# Patient Record
Sex: FEMALE | Race: WHITE | Employment: OTHER
[De-identification: names, ages, dates, MRNs, and addresses within clinical notes are randomized per-mention and may not be internally consistent; named-entity substitution may affect disease eponyms.]

---

## 2017-05-10 ENCOUNTER — OFFICE VISIT (OUTPATIENT)
Dept: DENTISTRY | Facility: CLINIC | Age: 77
End: 2017-05-10

## 2017-05-10 DIAGNOSIS — G47.33 OBSTRUCTIVE SLEEP APNEA: Primary | ICD-10-CM

## 2017-05-10 NOTE — LETTER
5/10/2017       RE: Jonna Bloom  [address]  [facility] [address]     Dear Colleague,    Thank you for referring your patient, Jonna Bloom, to the [facility] at [facility]. Please see a copy of my visit note below.    S:   - No change in general health status  - Appliance still works for pt  - Pt does not report any problems with it  - pt in no acute distress  - no jaw pain, jaw discomfort, or jaw related headaches  - no TMJ pain, no muscle tenderness  - no functional problems, no bite problems  - pt is back for more advancement of the appliance because appliance effect is not optimal  - stress ok  - family history not relevant for ANGELA treatment    O:  - Opening and protrusion not limited  - No M. Mass. + Temp palpation pain , no TMJ palpation pain, no neck palpation pain  - Pt has some occlusal changes on the left side  - Situation seems stable because pt does not hold the thin articulating paper but holds the 0.2 paper  - Appliance has good fit, occlusion OK  - V and VII are grossly intact  - Oral mucosa ok, lips ok, salivary glands ok     A:  - Obstructive sleep apnea    P:  - Discussed bite changes with pt  - Pt has not functional problems but feels sometimes that molars are not touching  - Discussed whether we should shorten the follow-up to 6 months but pt opted for the regular 1-year period  - Pt wondered whether there should a follow-up with sleep physician  I will send a note to nAnie Vilchis to inform her that the pt would appreciate some information what to do next    Again, thank you for allowing me to participate in the care of your patient.      Sincerely,    Malick Alanis DDS

## 2017-05-10 NOTE — PROGRESS NOTES
S:   - No change in general health status  - Appliance still works for pt  - Pt does not report any problems with it  - pt in no acute distress  - no jaw pain, jaw discomfort, or jaw related headaches  - no TMJ pain, no muscle tenderness  - no functional problems, no bite problems  - pt is back for more advancement of the appliance because appliance effect is not optimal  - stress ok  - family history not relevant for ANGELA treatment    O:  - Opening and protrusion not limited  - No M. Mass. + Temp palpation pain , no TMJ palpation pain, no neck palpation pain  - Pt has some occlusal changes on the left side  - Situation seems stable because pt does not hold the thin articulating paper but holds the 0.2 paper  - Appliance has good fit, occlusion OK  - V and VII are grossly intact  - Oral mucosa ok, lips ok, salivary glands ok     A:  - Obstructive sleep apnea    P:  - Discussed bite changes with pt  - Pt has not functional problems but feels sometimes that molars are not touching  - Discussed whether we should shorten the follow-up to 6 months but pt opted for the regular 1-year period  - Pt wondered whether there should a follow-up with sleep physician  - I will send a note to Annie Vilchis to inform her that the pt would appreciate some information what to do next

## 2017-05-15 ENCOUNTER — DOCUMENTATION ONLY (OUTPATIENT)
Dept: SLEEP MEDICINE | Facility: CLINIC | Age: 77
End: 2017-05-15

## 2017-05-15 NOTE — PROGRESS NOTES
Note reviewed from Dr. Alanis.  Pt doing well.      Plan for 2017 was:    Overnight oximetry-does she wish to  device from our office    2. Who does she want at [facility] to review her overnight oximetry    3.  Office visit if needed.

## 2017-07-27 ENCOUNTER — TRANSFERRED RECORDS (OUTPATIENT)
Dept: HEALTH INFORMATION MANAGEMENT | Facility: CLINIC | Age: 77
End: 2017-07-27

## 2017-08-02 ENCOUNTER — OFFICE VISIT (OUTPATIENT)
Dept: SLEEP MEDICINE | Facility: CLINIC | Age: 77
End: 2017-08-02

## 2017-08-02 DIAGNOSIS — G47.33 OSA (OBSTRUCTIVE SLEEP APNEA): Primary | ICD-10-CM

## 2017-08-02 NOTE — PROGRESS NOTES
Patient instructed on JASMIN use. Patient demonstrated and verbalized knowledge of use. Insurance was verified by financial securing. Device programmed. Device will be returned tomorrow before noon.      Kristi LynnGrande  Sleep Clinic - Specialist

## 2017-08-02 NOTE — MR AVS SNAPSHOT
"              After Visit Summary   8/2/2017    Jonna Bloom    MRN: [id number]           Patient Information     Date Of Birth          1940        Visit Information        Provider Department      8/2/2017 1:30 PM BED 7  SLEEP [facility]        Today's Diagnoses     ANGELA (obstructive sleep apnea)    -  1       Follow-ups after your visit        Your next 10 appointments already scheduled     Aug 03, 2017 10:00 AM CDT   Oximetry Drop Off with  SLEEP CENTER DME   [facility])    [address]  [facility] [id number] [phone]            Aug 04, 2017  1:30 PM CDT   Return Sleep Patient with Bennett Ezra Goltz, PA-C   [facility])    [address]  [facility] [id number] [phone]              Who to contact     If you have questions or need follow up information about today's clinic visit or your schedule please contact [facility] directly at [phone].  Normal or non-critical lab and imaging results will be communicated to you by MediaPasshart, letter or phone within 4 business days after the clinic has received the results. If you do not hear from us within 7 days, please contact the clinic through Coolest Coolert or phone. If you have a critical or abnormal lab result, we will notify you by phone as soon as possible.  Submit refill requests through MovieLaLa or call your pharmacy and they will forward the refill request to us. Please allow 3 business days for your refill to be completed.          Additional Information About Your Visit        MediaPasshart Information     MovieLaLa lets you send messages to your doctor, view your test results, renew your prescriptions, schedule appointments and more. To sign up, go to [email or web address] . Click on \"Log in\" on the left side of the screen, which will take you to the Welcome page. Then click on " "\"Sign up Now\" on the right side of the page.     You will be asked to enter the access code listed below, as well as some personal information. Please follow the directions to create your username and password.     Your access code is: [email or web address]  Expires: 10/31/2017  3:15 PM     Your access code will  in 90 days. If you need help or a new code, please call your [facility] clinic or [phone].        Care EveryWhere ID     This is your Care EveryWhere ID. This could be used by other organizations to access your [facility] medical records  [id number]         Blood Pressure from Last 3 Encounters:   16 129/72   07/21/15 140/65   14 132/69    Weight from Last 3 Encounters:   16 60.8 kg (134 lb)   07/21/15 64 kg (141 lb)   14 62.5 kg (137 lb 11.2 oz)              Today, you had the following     No orders found for display       Primary Care Provider Office Phone # Fax #    Isai Gin Castro [phone]       [facility] [address]  [facility] [address]        Equal Access to Services     DRISS ROBLES : Hadcristal olivares Sotee, wafranciscada mg, qaybta kaalmada kiana, amadeo bartlett. So [facility] [phone].    ATENCIÓN: Si habla español, tiene a fuentes disposición servicios gratuitos de asistencia lingüística. Jose Manuelame al [phone].    We comply with applicable federal civil rights laws and [place] laws. We do not discriminate on the basis of race, color, national origin, age, disability sex, sexual orientation or gender identity.            Thank you!     Thank you for choosing [facility]  for your care. Our goal is always to provide you with excellent care. Hearing back from our patients is one way we can continue to improve our services. Please take a few minutes to complete the written survey that you may receive in the mail after your visit with us. Thank you!             Your Updated Medication List - Protect " others around you: Learn how to safely use, store and throw away your medicines at www.disposemymeds.org.          This list is accurate as of: 8/2/17  3:15 PM.  Always use your most recent med list.                   Brand Name Dispense Instructions for use Diagnosis    ATIVAN PO      Take 0.5 mg by mouth as needed        CALCIUM 600 PO      Take 600 mg by mouth daily        calcium polycarbophil 625 MG tablet    FIBERCON     Take 3 tablets by mouth daily        MELATONIN PO      Take by mouth At Bedtime        MIRTAZAPINE PO      Take 7.5 mg by mouth At Bedtime        Multi-vitamin Tabs tablet      Take 1 tablet by mouth daily        NONFORMULARY      Biotin 2500mcg, Acidolphilus daily        OMEGA-3 FISH OIL PO      Take 1 g by mouth        RED YEAST RICE PO      Take by mouth daily        Urea 40 % Crea      Externally apply topically 2 times daily

## 2017-08-04 ENCOUNTER — OFFICE VISIT (OUTPATIENT)
Dept: SLEEP MEDICINE | Facility: CLINIC | Age: 77
End: 2017-08-04
Payer: COMMERCIAL

## 2017-08-04 VITALS
BODY MASS INDEX: 20.25 KG/M2 | HEIGHT: 67 IN | WEIGHT: 129 LBS | OXYGEN SATURATION: 98 % | HEART RATE: 61 BPM | DIASTOLIC BLOOD PRESSURE: 73 MMHG | SYSTOLIC BLOOD PRESSURE: 123 MMHG

## 2017-08-04 DIAGNOSIS — G25.81 RESTLESS LEGS SYNDROME (RLS): ICD-10-CM

## 2017-08-04 DIAGNOSIS — G47.00 INSOMNIA, UNSPECIFIED TYPE: ICD-10-CM

## 2017-08-04 DIAGNOSIS — G47.33 OSA (OBSTRUCTIVE SLEEP APNEA): Primary | ICD-10-CM

## 2017-08-04 PROCEDURE — 99214 OFFICE O/P EST MOD 30 MIN: CPT | Performed by: PHYSICIAN ASSISTANT

## 2017-08-04 NOTE — PROGRESS NOTES
Sleep Study Follow-Up Visit:    Date on this visit: 8/4/2017    Jonna Bloom comes in today for follow-up of her oximetry test. She has concerns of ANGELA, RLS and insomnia.      PSG  ANGELA: 2004 study at [place] with AHI of 17.6/hr. Her AHI was 57.7/hr on her back and 0.6/hr off her back.    She uses a dental appliance made by Malick Alanis at the [facility]. She last saw him a few months ago. Her dental appliance is 2-3 years old. She had another one prior to that. The current one is much more comfortable than the previous one. She notices a little crunching in her right jaw once in a while. She has not had that in a couple of weeks though. She notices it when she first takes it out. She thinks she sleeps mostly on her side. In the last couple of weeks, she has been a little more tired. Otherwise, she does not have significant sleep concerns.     Oximetry results were obtained for the date of 8/2/2017.  The patient was on a treatment of mandibular advancement device.  The study showed a valid recording time of 9:24 with a 4% desaturation index of 8.2/hr.  The mean oxygen saturation was 93% and the lowest SpO2 was 83%.  The patient spent 1.3 minutes below 89% SpO2.    Bedtime is 9-10 PM. She falls asleep in a variable amount of time. Sometimes she falls asleep right awake if she takes 5 mg zolpidem (4-5 times per week). She gets that from [place]. Her primary is Dr. Castro at [facility]. It really seems to help. On other nights, she takes 50 mg trazodone but that does not seem to effect her twitching. She tried mirtazapine a year ago. She used it for a digestive issue, but it increased her appetite. She occasionally takes melatonin. It does not really help. She did not really try levodopa much. That was prescribed over 5 years ago. She has not tried gabapentin. She was told her ferritin was low at [place].     She wakes 6:30-7 AM. She naps for 20 minutes about 1-2 times per week. She may wake 1-2 times per night for the  restroom. She falls right back to sleep. She rarely takes lorazepam, when she flies.    She says her TSH was slightly elevated at 4.5 at [place].    Past medical/surgical history, family history, social history, medications and allergies were reviewed.      Problem List:  Patient Active Problem List    Diagnosis Date Noted     Obstructive sleep apnea 08/04/2015     Priority: Medium     Problem list name updated by automated process. Provider to review       Hamstring muscle strain 04/16/2010     Priority: Medium        Impression/Plan:    (G47.33) ANGELA (obstructive sleep apnea)  (primary encounter diagnosis)  Comment: Her original study showed severe supine ANGELA and none lateral (but no REM on the baseline). She had oximetry with positional restriction in 2013 at [facility] which showed residual events (AHI 4/hr), likely in REM. She was sent to dentistry. The current oximetry study with the dental appliance shows a residual AHI 8/hr (I think she had more frequent minor breathing events that did not reach 4%). She thinks her dental appliance could be advanced, but she has concerns about doing that as she has had some crunching in her TM joint a couple weeks ago.  Plan: I suggested she get a Slumberbump. She can try that with the dental appliance, without the dental appliance, and compare that to how she feels with the dental appliance alone. Give each condition 2 weeks. I do no think she would have much risk to her health from the amount of residual apnea on either treatment, so we will work to find what gives her the best subjective sleep quality.    (G25.81) Restless legs syndrome (RLS)  Comment: She has been using zolpidem 5 mg to help her sleep, it works well. It resolves her twitching. Her ferritin was low according to [facility], but we do not know how low. She will call with that result.   Plan: We talked about how to take iron depending on what her ferritin was. We will recheck the ferritin in 2 months. We will consider  switching to gabapentin if she is still symptomatic once the ferritin is over 50. I do not want to make too many changes at one time. She is doing well on zolpidem.    Addendum 8/9/2017: She presented with lab results for 7/27/17. Her ferritin was 72.    (G47.00) Insomnia, unspecified type  Comment: She has been using 5 mg zolpidem to help her sleep. She also has trazodone but it does not work as well. She spends 8.5-10 hours in bed and has been taking short power naps a couple times per week. She feels she has always needed about 9 hours of sleep.  Plan: She was advised to reduce her time in bed to no more than 8.5 hours. Keep a consistent bedtime and wake time. We will work more on this at future visits.      She will follow up with me in about 2 month(s).     Twenty-five minutes spent with patient, all of which were spent face-to-face counseling, consulting, coordinating plan of care.      Bennett Goltz, PA-C    CC: No ref. provider found

## 2017-08-04 NOTE — PATIENT INSTRUCTIONS
If your ferritin is under 35, take iron twice daily. If between 35 and 50, take it once daily.  Levels under 50 can contribute to restless legs symptoms.  I recommend that you start taking an iron supplement, either ferrous sulfate, ferrous fumarate or ferrous gluconate. The dose should be 325 mg. It will be better absorbed if you take it with 500 mg vitamin C.  Ferrous sulfate is the most common form of iron supplement, but also the most likely to cause an upset stomach. Talk with a pharmacist to discuss options. Vitron C is a brand that has vitamin C included. Side effects of upset stomach, constipation and discoloration of stools can occur.  If these occur, take with food.  Ensure plenty of water and fiber in diet. Consider Colace if constipation is a problem.  Avoid taking it with levothyroxine and antacids.  We will recheck the ferritin level in 2-3 months.  Reduce your time in bed to 8.5 hours. Try to keep a very consistent bedtime and wake time. Do that for 2 weeks. If you continue to have difficulty falling asleep, push your bedtime 15 minutes later for the next week, keep the wake time the same. If you are sleeping through the night reliably, try cutting the zolpidem in half.  Try using a Slumberbump device with and without the dental appliance. Keep track of how you feel. Give each condition at least 2 weeks to have a good sample.  General recommendations for sleep problems (Insomnia)  Allow 2-4 weeks to see results     Establish a regular sleep schedule    Most people only need 7-8 hours of sleep.  Don't be in bed longer than you need     to sleep or you will end up spending more time awake in bed. This trains your    brain to think of the bed as a place to not sleep.  Go to bed at same time each night   Get up at same time each day - Set an alarm everyday (even weekends). This is one of    the most important tips. It prevents you from relying on your insomnia to get you    up on time for your day. That  actually reinforces insomnia. It also will help your    body get into a pattern where you start feeling tired at a consistent time each    night.  The body functions best when you keep a consistent routine.  Avoid sleeping-in and napping. Anytime you sleep during the day, you will be less tired at    night. You may be tired enough to fall asleep, but you will wake more in the    middle of the night because you will have met your sleep need before the night is    done.   Cut down time in bed (if not asleep, get up)- Use your bed only for sleep and sex    Anytime you spend time in bed doing activities other than sleep (reading,    watching TV, working, playing on the computer or phone, or even just laying in    bed trying to sleep), you are training  your brain to think of the bed as a place to    do activities other than sleep. If you are not falling asleep within 20-30 minutes,    get out of bed. While out of bed, avoid bright lights. Avoid work or chores. Being    productive in the middle of the night reinforces waking up at night. Find relaxing,    not particularly entertaining activities like reading, listening to music, or relaxation    exercises. Go back to bed if you start feeling groggy, or after about 30 minutes,    even if not feeling very tired. Sometimes, just getting out of bed stops the pattern    of getting frustrated about laying in bed not sleeping, and that can help you fall    asleep.   Avoid trying to force yourself to sleep- sleep is not like everything else. The harder you    work at most things, the more you can accomplish. The harder you work at    sleep, the less you will sleep.     Make the bedroom comfortable - quiet, dark and cool are better. Consider ear plugs    (silicon). Use dark blinds or wear an eye mask if needed     Make a relaxing routine prior to bedtime  Relaxation exercises:   Progressive muscle relaxation: Relax each muscle group individually    Begin with your feet, flex,  then relax. Try to imagine your feet feeling heavy and sinking into the bed. Move to your calves, do the same thing. Work through each muscle group toward your head.    Relaxing Mental Imagery: Try to imagine a trip that you took and found relaxing, or imagine a day at the beach. Try to walk yourself through the day in your mind as if you were dreaming it. Try to imagine sensing the different experiences, such as feeling sand between your toes, the heat of the sun on your skin, seeing the waves crashing the shore, the smell of the salt water, etc.     Deal with your worries before bedtime    Set aside a worry time around dinner time for 10-15 minutes. Write down the    things that are on your mind. Plan time in the coming days to address those    issues. Brainstorm ideas on how you will deal with them. Try to identify issues    that are out of your control, and try to let those issues go.  Listen to relaxation tapes   Classical Music or Nature sounds   Back Massage   Get regular exercise each day (at least 1-2 hours before bedtime)   Take medications only as directed   Eat a light bedtime snack or warm drink   Warm milk   Warm herbal tea (non-caffeinated)       Things to avoid   No overstimulating activities just before bed   No competitive games before bedtime   No exciting television programs before bedtime   Avoid caffeine after lunchtime   Avoid chocolate   Do not use alcohol to induce sleep (worsens Insomnia)   Do not take someone else's sleeping pills   Do not look at the clock when awakening   Do not turn on light when getting up to use bathroom, use a nightlight     Online Programs     www.SHUTi.me (pronounced shut eye). There is a fee for this program. Enter the code  [place]  if you decide to enroll in this program.      www.sleepIO.com (pronounced sleep ee oh). There is a fee for this program. Enter the code  [place]  if you decide to enroll in this program.     Suggested Resources  Insomnia Treatment  Books     Overcoming Insomnia by Srinath Valerio and Rocio Hubbard (2008)    No More Sleepless Nights by Johan aGy and Ro Pierce (1996)    Say Marylin to Insomnia by Ryan Nuñez (2009)    The Insomnia Workbook by Ann Briones and Elver Vance (2009)    The Insomnia Answer by Ward Carlos and Julio Munguia (2006)      Stress Management and Relaxation Books    The Relaxation and Stress Reduction Workbook by Laila Gerardo, Fannie Mello and David Krishnan (2008)    Stress Management Workbook: Techniques and Self-Assessment Procedures by Natalie Amaya and Byron Joyce (1997)    A Mindfulness-Based Stress Reduction Workbook by Kavon Whyte and Lory Ibarra (2010)    The Complete Stress Management Workbook by Brendan Pham, Fahad Manuel and Bill Beavers (1996)    Assert Yourself by Cary Adams and Zeke Adams (1977)    Relaxation Resources for Computer Download   These websites offer resources to help you relax. This list is for information only. [facility] is not responsible for the quality of services or the actions of any person or organization.  Progressive Muscle Relaxation (PMR):     http://www.Sellvana/progressive-muscle-relaxation-exercise.html     http://studentsupport.[facility]/counseling/resources/self-help/relaxation-and-stress-management/   Deep Breathing Exercises:    http://www.Sellvana/breathing-awareness.html     Meditation:     www.Conduit    www.theddmap.comguidedddmap.commeditation-site.com You may have to pay for some of these resources.    Guided Imagery:    http://www.Sellvana/guided-imagery-scripts.html     http://"[employer]"/library/deokrhfvcb-sbqfny-uvphovq/     Counseling / Behavioral Health  [facility]  Visit [email or web address] or call [phone] to find a clinic close to you.  Or call [phone] for [facility].       Your BMI is Body  mass index is 20.06 kg/(m^2).  Weight management is a personal decision.  If you are interested in exploring weight loss strategies, the following discussion covers the approaches that may be successful. Body mass index (BMI) is one way to tell whether you are at a healthy weight, overweight, or obese. It measures your weight in relation to your height.  A BMI of 18.5 to 24.9 is in the healthy range. A person with a BMI of 25 to 29.9 is considered overweight, and someone with a BMI of 30 or greater is considered obese. More than two-thirds of American adults are considered overweight or obese.  Being overweight or obese increases the risk for further weight gain. Excess weight may lead to heart disease and diabetes.  Creating and following plans for healthy eating and physical activity may help you improve your health.  Weight control is part of healthy lifestyle and includes exercise, emotional health, and healthy eating habits. Careful eating habits lifelong are the mainstay of weight control. Though there are significant health benefits from weight loss, long-term weight loss with diet alone may be very difficult to achieve- studies show long-term success with dietary management in less than 10% of people. Attaining a healthy weight may be especially difficult to achieve in those with severe obesity. In some cases, medications, devices and surgical management might be considered.  What can you do?  If you are overweight or obese and are interested in methods for weight loss, you should discuss this with your provider.     Consider reducing daily calorie intake by 500 calories.     Keep a food journal.     Avoiding skipping meals, consider cutting portions instead.    Diet combined with exercise helps maintain muscle while optimizing fat loss. Strength training is particularly important for building and maintaining muscle mass. Exercise helps reduce stress, increase energy, and improves fitness. Increasing  exercise without diet control, however, may not burn enough calories to loose weight.       Start walking three days a week 10-20 minutes at a time    Work towards walking thirty minutes five days a week     Eventually, increase the speed of your walking for 1-2 minutes at time    In addition, we recommend that you review healthy lifestyles and methods for weight loss available through the National Institutes of Health patient information sites:  http://win.niddk.nih.gov/publications/index.htm    And look into health and wellness programs that may be available through your health insurance provider, employer, local community center, or glendy club.      ]

## 2017-08-04 NOTE — NURSING NOTE
"Chief Complaint   Patient presents with     Results     JASMIN results       Initial /73  Pulse 61  Ht 1.708 m (5' 7.24\")  Wt 58.5 kg (129 lb)  SpO2 98%  BMI 20.06 kg/m2 Estimated body mass index is 20.06 kg/(m^2) as calculated from the following:    Height as of this encounter: 1.708 m (5' 7.24\").    Weight as of this encounter: 58.5 kg (129 lb).  Medication Reconciliation: complete   Sara Ambrocio MA      "

## 2017-08-04 NOTE — MR AVS SNAPSHOT
After Visit Summary   8/4/2017    Jonna Bloom    MRN: [id number]           Patient Information     Date Of Birth          1940        Visit Information        Provider Department      8/4/2017 1:30 PM Goltz, Bennett Ezra, PA-C [facility]        Today's Diagnoses     ANGELA (obstructive sleep apnea)    -  1    Restless legs syndrome (RLS)        Insomnia, unspecified type          Care Instructions    If your ferritin is under 35, take iron twice daily. If between 35 and 50, take it once daily.  Levels under 50 can contribute to restless legs symptoms.  I recommend that you start taking an iron supplement, either ferrous sulfate, ferrous fumarate or ferrous gluconate. The dose should be 325 mg. It will be better absorbed if you take it with 500 mg vitamin C.  Ferrous sulfate is the most common form of iron supplement, but also the most likely to cause an upset stomach. Talk with a pharmacist to discuss options. Vitron C is a brand that has vitamin C included. Side effects of upset stomach, constipation and discoloration of stools can occur.  If these occur, take with food.  Ensure plenty of water and fiber in diet. Consider Colace if constipation is a problem.  Avoid taking it with levothyroxine and antacids.  We will recheck the ferritin level in 2-3 months.  Reduce your time in bed to 8.5 hours. Try to keep a very consistent bedtime and wake time. Do that for 2 weeks. If you continue to have difficulty falling asleep, push your bedtime 15 minutes later for the next week, keep the wake time the same. If you are sleeping through the night reliably, try cutting the zolpidem in half.  Try using a Slumberbump device with and without the dental appliance. Keep track of how you feel. Give each condition at least 2 weeks to have a good sample.  General recommendations for sleep problems (Insomnia)  Allow 2-4 weeks to see results     Establish a regular sleep schedule    Most people  only need 7-8 hours of sleep.  Don't be in bed longer than you need     to sleep or you will end up spending more time awake in bed. This trains your    brain to think of the bed as a place to not sleep.  Go to bed at same time each night   Get up at same time each day - Set an alarm everyday (even weekends). This is one of    the most important tips. It prevents you from relying on your insomnia to get you    up on time for your day. That actually reinforces insomnia. It also will help your    body get into a pattern where you start feeling tired at a consistent time each    night.  The body functions best when you keep a consistent routine.  Avoid sleeping-in and napping. Anytime you sleep during the day, you will be less tired at    night. You may be tired enough to fall asleep, but you will wake more in the    middle of the night because you will have met your sleep need before the night is    done.   Cut down time in bed (if not asleep, get up)- Use your bed only for sleep and sex    Anytime you spend time in bed doing activities other than sleep (reading,    watching TV, working, playing on the computer or phone, or even just laying in    bed trying to sleep), you are training  your brain to think of the bed as a place to    do activities other than sleep. If you are not falling asleep within 20-30 minutes,    get out of bed. While out of bed, avoid bright lights. Avoid work or chores. Being    productive in the middle of the night reinforces waking up at night. Find relaxing,    not particularly entertaining activities like reading, listening to music, or relaxation    exercises. Go back to bed if you start feeling groggy, or after about 30 minutes,    even if not feeling very tired. Sometimes, just getting out of bed stops the pattern    of getting frustrated about laying in bed not sleeping, and that can help you fall    asleep.   Avoid trying to force yourself to sleep- sleep is not like everything else.  The harder you    work at most things, the more you can accomplish. The harder you work at    sleep, the less you will sleep.     Make the bedroom comfortable - quiet, dark and cool are better. Consider ear plugs    (silicon). Use dark blinds or wear an eye mask if needed     Make a relaxing routine prior to bedtime  Relaxation exercises:   Progressive muscle relaxation: Relax each muscle group individually    Begin with your feet, flex, then relax. Try to imagine your feet feeling heavy and sinking into the bed. Move to your calves, do the same thing. Work through each muscle group toward your head.    Relaxing Mental Imagery: Try to imagine a trip that you took and found relaxing, or imagine a day at the beach. Try to walk yourself through the day in your mind as if you were dreaming it. Try to imagine sensing the different experiences, such as feeling sand between your toes, the heat of the sun on your skin, seeing the waves crashing the shore, the smell of the salt water, etc.     Deal with your worries before bedtime    Set aside a worry time around dinner time for 10-15 minutes. Write down the    things that are on your mind. Plan time in the coming days to address those    issues. Brainstorm ideas on how you will deal with them. Try to identify issues    that are out of your control, and try to let those issues go.  Listen to relaxation tapes   Classical Music or Nature sounds   Back Massage   Get regular exercise each day (at least 1-2 hours before bedtime)   Take medications only as directed   Eat a light bedtime snack or warm drink   Warm milk   Warm herbal tea (non-caffeinated)       Things to avoid   No overstimulating activities just before bed   No competitive games before bedtime   No exciting television programs before bedtime   Avoid caffeine after lunchtime   Avoid chocolate   Do not use alcohol to induce sleep (worsens Insomnia)   Do not take someone else's sleeping pills   Do not look at the  clock when awakening   Do not turn on light when getting up to use bathroom, use a nightlight     Online Programs     www.SHUTi.me (pronounced shut eye). There is a fee for this program. Enter the code  [place]  if you decide to enroll in this program.      www.sleepIO.com (pronounced sleep ee oh). There is a fee for this program. Enter the code  [place]  if you decide to enroll in this program.     Suggested Resources  Insomnia Treatment Books     Overcoming Insomnia by Srinath Valerio and Rocio Hubbard (2008)    No More Sleepless Nights by Johan Gay and Ro Pierce (1996)    Say Marylin to Insomnia by Ryan Nuñez (2009)    The Insomnia Workbook by Ann Briones and Elver Vance (2009)    The Insomnia Answer by Ward Carlos and Julio Munguia (2006)      Stress Management and Relaxation Books    The Relaxation and Stress Reduction Workbook by Laila Gerardo, Fannie Mello and David Krishnan (2008)    Stress Management Workbook: Techniques and Self-Assessment Procedures by Natalie Amaya and Byron Joyce (1997)    A Mindfulness-Based Stress Reduction Workbook by Kavon Whyte and Lory Ibarra (2010)    The Complete Stress Management Workbook by Brendan Pham, Fahad Manuel and Bill Beavers (1996)    Assert Yourself by Cary Adams and Zeke Adams (1977)    Relaxation Resources for Computer Download   These websites offer resources to help you relax. This list is for information only. [place] is not responsible for the quality of services or the actions of any person or organization.  Progressive Muscle Relaxation (PMR):     http://www.Corceuticals.Banki.ru/progressive-muscle-relaxation-exercise.html     http://studentsupport.[facility]/counseling/resources/self-help/relaxation-and-stress-management/   Deep Breathing Exercises:    http://www.Corceuticals.Banki.ru/breathing-awareness.html      Meditation:     www.Varthana    www.thePlastioguided-meditation-site.com You may have to pay for some of these resources.    Guided Imagery:    http://www.[employer]/guided-imagery-scripts.html     http://Tus reQRdos/library/gmdppzhcct-yevszx-oihdqig/     Counseling / Behavioral Health  [facility]  Visit [email or web address] or call [phone] to find a clinic close to you.  Or call [phone] for [facility].       Your BMI is Body mass index is 20.06 kg/(m^2).  Weight management is a personal decision.  If you are interested in exploring weight loss strategies, the following discussion covers the approaches that may be successful. Body mass index (BMI) is one way to tell whether you are at a healthy weight, overweight, or obese. It measures your weight in relation to your height.  A BMI of 18.5 to 24.9 is in the healthy range. A person with a BMI of 25 to 29.9 is considered overweight, and someone with a BMI of 30 or greater is considered obese. More than two-thirds of American adults are considered overweight or obese.  Being overweight or obese increases the risk for further weight gain. Excess weight may lead to heart disease and diabetes.  Creating and following plans for healthy eating and physical activity may help you improve your health.  Weight control is part of healthy lifestyle and includes exercise, emotional health, and healthy eating habits. Careful eating habits lifelong are the mainstay of weight control. Though there are significant health benefits from weight loss, long-term weight loss with diet alone may be very difficult to achieve- studies show long-term success with dietary management in less than 10% of people. Attaining a healthy weight may be especially difficult to achieve in those with severe obesity. In some cases, medications, devices and surgical management might be considered.  What can you do?  If you are  overweight or obese and are interested in methods for weight loss, you should discuss this with your provider.     Consider reducing daily calorie intake by 500 calories.     Keep a food journal.     Avoiding skipping meals, consider cutting portions instead.    Diet combined with exercise helps maintain muscle while optimizing fat loss. Strength training is particularly important for building and maintaining muscle mass. Exercise helps reduce stress, increase energy, and improves fitness. Increasing exercise without diet control, however, may not burn enough calories to loose weight.       Start walking three days a week 10-20 minutes at a time    Work towards walking thirty minutes five days a week     Eventually, increase the speed of your walking for 1-2 minutes at time    In addition, we recommend that you review healthy lifestyles and methods for weight loss available through the National Institutes of Health patient information sites:  http://win.niddk.nih.gov/publications/index.htm    And look into health and wellness programs that may be available through your health insurance provider, employer, local community center, or [employer].      ]          Follow-ups after your visit        Who to contact     If you have questions or need follow up information about today's clinic visit or your schedule please contact [facility] directly at [phone].  Normal or non-critical lab and imaging results will be communicated to you by MyChart, letter or phone within 4 business days after the clinic has received the results. If you do not hear from us within 7 days, please contact the clinic through MyChart or phone. If you have a critical or abnormal lab result, we will notify you by phone as soon as possible.  Submit refill requests through [employer] or call your pharmacy and they will forward the refill request to us. Please allow 3 business days for your refill to be completed.          Additional  "Information About Your Visit        MyChart Information     V[employer]olio lets you send messages to your doctor, view your test results, renew your prescriptions, schedule appointments and more. To sign up, go to [email or web address] . Click on \"Log in\" on the left side of the screen, which will take you to the Welcome page. Then click on \"Sign up Now\" on the right side of the page.     You will be asked to enter the access code listed below, as well as some personal information. Please follow the directions to create your username and password.     Your access code is: [email or web address]  Expires: 10/31/2017  3:15 PM     Your access code will  in 90 days. If you need help or a new code, please call your [place] clinic or [phone].        Care EveryWhere ID     This is your Care EveryWhere ID. This could be used by other organizations to access your [place] medical records  [id number]        Your Vitals Were     Pulse Height Pulse Oximetry BMI (Body Mass Index)          61 1.708 m (5' 7.24\") 98% 20.06 kg/m2         Blood Pressure from Last 3 Encounters:   17 123/73   16 129/72   07/21/15 140/65    Weight from Last 3 Encounters:   17 58.5 kg (129 lb)   16 60.8 kg (134 lb)   07/21/15 64 kg (141 lb)              Today, you had the following     No orders found for display       Primary Care Provider Office Phone # Fax #    Isai Gin Castro [phone]       [facility] [address]  [facility] [address]        Equal Access to Services     [facility]: Hadii mera Henry, reed holliday, amadeo marshall. So [facility] [phone].    ATENCIÓN: Si habla español, tiene a fuentes disposición servicios gratuitos de asistencia lingüística. Llame al [phone].    We comply with applicable federal civil rights laws and [place] laws. We do not discriminate on the basis of race, color, national origin, age, disability " sex, sexual orientation or gender identity.            Thank you!     Thank you for choosing [facility]  for your care. Our goal is always to provide you with excellent care. Hearing back from our patients is one way we can continue to improve our services. Please take a few minutes to complete the written survey that you may receive in the mail after your visit with us. Thank you!             Your Updated Medication List - Protect others around you: Learn how to safely use, store and throw away your medicines at www.disposemymeds.org.          This list is accurate as of: 8/4/17  2:42 PM.  Always use your most recent med list.                   Brand Name Dispense Instructions for use Diagnosis    ATIVAN PO      Take 0.5 mg by mouth as needed        CALCIUM 600 PO      Take 600 mg by mouth daily        calcium polycarbophil 625 MG tablet    FIBERCON     Take 3 tablets by mouth daily        MELATONIN PO      Take by mouth At Bedtime        Multi-vitamin Tabs tablet      Take 1 tablet by mouth daily        NONFORMULARY      Biotin 2500mcg, Acidolphilus daily        OMEGA-3 FISH OIL PO      Take 1 g by mouth        RED YEAST RICE PO      Take by mouth daily        Urea 40 % Crea      Externally apply topically 2 times daily

## 2017-08-20 NOTE — PROCEDURES
PHYSICIAN INTERPRETATION   HOME OXIMETRY   Patient: Jonna Bloom  MRN: [id number]  YOB: 1940  Study Date: 8/2/17   Referring Physician: Isai Castro  Ordering Physician: Annie Vilchis MD     Conditions:  MAD,Room air  Quality: artifact noted 0.2%     Measure [threshold]                 Time less than or equal to SpO2 88% [5 min]:  1.3 min  Duration monitoring [> 2 hours artifact free]:  9:24 hours                    4% O2 desat index [ > 15/ hour]:    8.2 / hour                    Pattern:       normal                                  Assessment:   Normal study    Recommendations:  The following changes in MAD settings were ordered:  None, pt will follow with Bennett Goltz PA    Diagnosis Code(s):    JOVITA Luke CNP, August 20, 2017

## 2017-08-23 ENCOUNTER — OFFICE VISIT (OUTPATIENT)
Dept: DENTISTRY | Facility: CLINIC | Age: 77
End: 2017-08-23

## 2017-08-23 DIAGNOSIS — G47.33 OBSTRUCTIVE SLEEP APNEA: Primary | ICD-10-CM

## 2017-08-23 NOTE — LETTER
8/23/2017       RE: Jonna Bloom  [address]  [place] [place] [address]     Dear Colleague,    Thank you for referring your patient, Jonna Bloom, to the [facility] at [facility]. Please see a copy of my visit note below.    S:   - No change in general health status  - pt in no acute distress  - Pt reports problems with appliance  - Pt report joint noises (right more than left side) and is worried about the noises  - Pt also had once problems chewing, pt reported that appliance aggravates the joint problem  - Pt pointed also to masseter area where problems occurred  - Pain intensity was not very strong, pt limited chewing   - Pt has a history of other joint problem, hip replacement  - family history not relevant for ANGELA treatment    O:  - Opening and protrusion not limited  - No M. Mass. + Temp palpation pain , no TMJ palpation pain, no neck palpation pain  - Crepitus on both sides, right more than left  - Pt has some occlusal changes on the left side but they are stable  - Appliance has good fit, occlusion OK  - V and VII are grossly intact  - Oral mucosa ok, lips ok, salivary glands ok   - Backwards adjustment of appliance -0.5mm    A:  - Obstructive sleep apnea  - Osteoarthrosis of the TMJ  - TMJ arthralgia  - Myofascial pain    P:  - Discussed the structural changes in the TMJ  - Explained to pt that jaw can function adequately even if substantial degenerative TMJ changes are present  - Pt should decrease TMJ load (avoid wide mouth opening, cut apples to avoid mouth opening, put hand under chin to limit wide mouth opening)  - When TMJ problems get more severe, pt should use appliance less  - Physical therapy can be used in the future to help with TMJ pain  - Pt is scheduled for October for follow-up with sleep physician  - I will write a note to sleep physician to inform him about TMJ situation  Total time: 50 min, spent 40 counseling and coordinating  care    Again, thank you for allowing me to participate in the care of your patient.      Sincerely,    Malick Alanis DDS

## 2017-08-25 NOTE — PROGRESS NOTES
S:   - No change in general health status  - pt in no acute distress  - Pt reports problems with appliance  - Pt report joint noises (right more than left side) and is worried about the noises  - Pt also had once problems chewing, pt reported that appliance aggravates the joint problem  - Pt pointed also to masseter area where problems occurred  - Pain intensity was not very strong, pt limited chewing   - Pt has a history of other joint problem, hip replacement  - family history not relevant for ANGELA treatment    O:  - Opening and protrusion not limited  - No M. Mass. + Temp palpation pain , no TMJ palpation pain, no neck palpation pain  - Crepitus on both sides, right more than left  - Pt has some occlusal changes on the left side but they are stable  - Appliance has good fit, occlusion OK  - V and VII are grossly intact  - Oral mucosa ok, lips ok, salivary glands ok   - Backwards adjustment of appliance -0.5mm    A:  - Obstructive sleep apnea  - Osteoarthrosis of the TMJ  - TMJ arthralgia  - Myofascial pain    P:  - Discussed the structural changes in the TMJ  - Explained to pt that jaw can function adequately even if substantial degenerative TMJ changes are present  - Pt should decrease TMJ load (avoid wide mouth opening, cut apples to avoid mouth opening, put hand under chin to limit wide mouth opening)  - When TMJ problems get more severe, pt should use appliance less  - Physical therapy can be used in the future to help with TMJ pain  - Pt is scheduled for October for follow-up with sleep physician  - I will write a note to sleep physician to inform him about TMJ situation  - Total time: 50 min, spent 40 counseling and coordinating care

## 2017-09-29 DIAGNOSIS — G25.81 RESTLESS LEGS SYNDROME (RLS): ICD-10-CM

## 2017-09-29 PROCEDURE — 82728 ASSAY OF FERRITIN: CPT | Performed by: PHYSICIAN ASSISTANT

## 2017-09-29 PROCEDURE — 36415 COLL VENOUS BLD VENIPUNCTURE: CPT | Performed by: PHYSICIAN ASSISTANT

## 2017-09-30 LAB — FERRITIN SERPL-MCNC: 101 NG/ML (ref 8–252)

## 2017-10-04 ENCOUNTER — OFFICE VISIT (OUTPATIENT)
Dept: SLEEP MEDICINE | Facility: CLINIC | Age: 77
End: 2017-10-04
Payer: COMMERCIAL

## 2017-10-04 VITALS
WEIGHT: 127 LBS | HEIGHT: 67 IN | HEART RATE: 58 BPM | BODY MASS INDEX: 19.93 KG/M2 | SYSTOLIC BLOOD PRESSURE: 129 MMHG | DIASTOLIC BLOOD PRESSURE: 74 MMHG | RESPIRATION RATE: 14 BRPM | OXYGEN SATURATION: 98 %

## 2017-10-04 DIAGNOSIS — G47.33 OBSTRUCTIVE SLEEP APNEA: Primary | ICD-10-CM

## 2017-10-04 DIAGNOSIS — F51.04 CHRONIC INSOMNIA: ICD-10-CM

## 2017-10-04 DIAGNOSIS — G25.81 RESTLESS LEGS SYNDROME (RLS): ICD-10-CM

## 2017-10-04 PROCEDURE — 99214 OFFICE O/P EST MOD 30 MIN: CPT | Performed by: PHYSICIAN ASSISTANT

## 2017-10-04 RX ORDER — GABAPENTIN 100 MG/1
CAPSULE ORAL
Qty: 90 CAPSULE | Refills: 1 | Status: SHIPPED | OUTPATIENT
Start: 2017-10-04 | End: 2019-01-24

## 2017-10-04 NOTE — NURSING NOTE
"Chief Complaint   Patient presents with     Sleep Problem     Follow up lab work       Initial /74  Pulse 58  Resp 14  Ht 1.702 m (5' 7\")  Wt 57.6 kg (127 lb)  SpO2 98%  BMI 19.89 kg/m2 Estimated body mass index is 19.89 kg/(m^2) as calculated from the following:    Height as of this encounter: 1.702 m (5' 7\").    Weight as of this encounter: 57.6 kg (127 lb).  Medication Reconciliation: complete   ESS 2  Sara Ambrocio MA      "

## 2017-10-04 NOTE — PATIENT INSTRUCTIONS
Switch from zolpidem to gabapentin. Take it about 15-30 minutes prior to typical onset of restlessness. Start with 100 mg (1 capsule). You may increase every 5 days by 1 capsule as needed. If you get to 300 mg, you can call me and I will prescribe a 300 mg capsule and you can go up from there ([phone]). The max is 1200 mg.

## 2017-10-04 NOTE — PROGRESS NOTES
Sleep Study Follow-Up Visit:    Date on this visit: 10/4/2017    Jonna Bloom comes in today for follow-up of her treatment for ANGELA, RLS and insomnia.     PSG  ANGELA: 2004 study at [facility] with AHI of 17.6/hr. Her AHI was 57.7/hr on her back and 0.6/hr off her back.  At her last visit, we discussed experimenting with positional restriction and the dental appliance in conjunction and in isolation. She was to identify which gave her the best subjective sleep.  Oximetry in August with the dental appliance showed an ERIN of 8/hr.     She uses 5 mg zolpidem to treat RLS and insomnia. Her ferritin was 72 at [facility] in July. It was 101 on 9/29. She is on 1 tab of iron. She does not notice a difference in her restlessness.    She feels things are going alright. She did not have a good experience with Slumberbump. The customer service was poor and the sizes were not right for her. She uses a backpack with tennis balls in it. She has a hard time evaluating how well she is sleeping.   Bedtime has been 9 PM. She falls asleep quickly with zolpidem 5 mg. She has struggled to stay up later. She is up at 6:30 AM. She is sometimes awake before then. She wakes at 12 and 4 AM for the restroom. She denies much trouble getting back to sleep. She feels she would twitch if she did not take the zolpidem. She is sometimes tired in the afternoon if she has been really active in the morning. She denies dozing if she sits to rest.  She recalls being given a medication (at [facility]) for Parkinsons for her RLS, but being told not to take it too much or she would have to switch medication. She felt zolpidem worked much better.    Past medical/surgical history, family history, social history, medications and allergies were reviewed.      Problem List:  Patient Active Problem List    Diagnosis Date Noted     Obstructive sleep apnea 08/04/2015     Priority: Medium     Problem list name updated by automated process. Provider to review       Hamstring muscle  strain 04/16/2010     Priority: Medium        Impression/Plan:    (G47.33) Obstructive sleep apnea  (primary encounter diagnosis)  Comment: She has been treating with dental appliance and positional restriction. She may have very mild residual ANGELA at times, but not a concerning health risk. She does not really appreciate much difference with either device.   Plan: Continue to evaluate sleep quality with different treatments (dental appliance, positional restriction, or both). Use whatever seems to be most effective or most convenient. Either are reasonably effective.    (G25.81) Restless legs syndrome (RLS)  Comment: She has been using 5 mg zolpidem. She says she gets twitchy without it. Her ferritin is 101.   Plan: gabapentin (NEURONTIN) 100 MG capsule        We discussed that zolpidem is not advised in people over 65. We will switch from zolpidem to gabapentin 100 mg capsules. May increase gabapentin by 100 mg every 5 days as needed. She was told to call if she gets to 300 mg and I can prescribe a larger capsule. Max 1200 mg. She was told she can discontinue iron for now.    (F51.04) Chronic insomnia  Comment: She takes 5 mg zolpidem and sleep reasonably well. She sleeps from 9 PM to 6:30 AM. She does not know how she would sleep without zolpidem. She wakes twice a night for the restroom.  Plan: If she has more trouble sleeping off of the zolpidem, she was encouraged to either go to bed 30 minutes later or get up 30 minutes earlier. We will discuss further recommendations at the follow up as needed      She will follow up with me in about 2 month(s).     Twenty-five minutes spent with patient, all of which were spent face-to-face counseling, consulting, coordinating plan of care.      Bennett Goltz, PA-C    CC: No ref. provider found

## 2018-10-24 ENCOUNTER — OFFICE VISIT (OUTPATIENT)
Dept: SLEEP MEDICINE | Facility: CLINIC | Age: 78
End: 2018-10-24
Payer: COMMERCIAL

## 2018-10-24 VITALS
SYSTOLIC BLOOD PRESSURE: 133 MMHG | BODY MASS INDEX: 20.88 KG/M2 | HEIGHT: 67 IN | WEIGHT: 133 LBS | HEART RATE: 56 BPM | OXYGEN SATURATION: 98 % | RESPIRATION RATE: 14 BRPM | TEMPERATURE: 97.8 F | DIASTOLIC BLOOD PRESSURE: 80 MMHG

## 2018-10-24 DIAGNOSIS — G47.33 OBSTRUCTIVE SLEEP APNEA: Primary | ICD-10-CM

## 2018-10-24 DIAGNOSIS — F51.04 CHRONIC INSOMNIA: ICD-10-CM

## 2018-10-24 DIAGNOSIS — G25.81 RESTLESS LEGS SYNDROME (RLS): ICD-10-CM

## 2018-10-24 PROCEDURE — 99214 OFFICE O/P EST MOD 30 MIN: CPT | Performed by: PHYSICIAN ASSISTANT

## 2018-10-24 NOTE — MR AVS SNAPSHOT
After Visit Summary   10/24/2018    Jonna Bloom    MRN: [id number]           Patient Information     Date Of Birth          1940        Visit Information        Provider Department      10/24/2018 3:30 PM Goltz, Bennett Ezra, PA-C [facility]        Today's Diagnoses     Obstructive sleep apnea    -  1    Restless legs syndrome (RLS)        Chronic insomnia          Care Instructions    You will be provided with an auto-titrating CPAP with a pressure range of 5-15 cm with heated humidity to limit nasal congestion. Adjust the heat level on humidifier to find a setting that prevents dry nose but does not cause condensation in the hose or mask. Use distilled water in the humidifier.  The CPAP has a ramp function that starts the pressure lower than your prescribed pressure and gradually increases it over a number of minutes.  This may make it easier to fall asleep.    Try to use the CPAP every-night, all night (minimum of 4 hours). Many insurances require that we prove you are using the CPAP at least 4 hours on at least 70% of nights over a 30 day period. We have 90 days to meet those criteria.            Discussed weight management and the impact of weight gain on sleep apnea.  Let me know if you snore or feel the pressure is too high.    You can get new supplies (mask, hose and filter) for your CPAP every 3-6 months, covered by insurance. You do not need to get supplies that often, but they are available if you would like them.  You may exchange the mask once within the first month if you feel the initial mask does not fit well.  Contact your medical equipment provider for equipment issues.  Please let me know if you have any return of snoring, daytime sleepiness or poor sleep quality. We will want to make sure your CPAP is adequately treating your apnea.  There is a website called CPAP.com that has accessories that may make CPAP use easier. Please visit it at your  convenience.  Our phone number is [phone].    Follow-up 2 month.  Bring your CPAP machine with you to the follow up appointment.    Your BMI is Body mass index is 20.83 kg/(m^2).  Weight management is a personal decision.  If you are interested in exploring weight loss strategies, the following discussion covers the approaches that may be successful. Body mass index (BMI) is one way to tell whether you are at a healthy weight, overweight, or obese. It measures your weight in relation to your height.  A BMI of 18.5 to 24.9 is in the healthy range. A person with a BMI of 25 to 29.9 is considered overweight, and someone with a BMI of 30 or greater is considered obese. More than two-thirds of American adults are considered overweight or obese.  Being overweight or obese increases the risk for further weight gain. Excess weight may lead to heart disease and diabetes.  Creating and following plans for healthy eating and physical activity may help you improve your health.  Weight control is part of healthy lifestyle and includes exercise, emotional health, and healthy eating habits. Careful eating habits lifelong are the mainstay of weight control. Though there are significant health benefits from weight loss, long-term weight loss with diet alone may be very difficult to achieve- studies show long-term success with dietary management in less than 10% of people. Attaining a healthy weight may be especially difficult to achieve in those with severe obesity. In some cases, medications, devices and surgical management might be considered.  What can you do?  If you are overweight or obese and are interested in methods for weight loss, you should discuss this with your provider.     Consider reducing daily calorie intake by 500 calories.     Keep a food journal.     Avoiding skipping meals, consider cutting portions instead.    Diet combined with exercise helps maintain muscle while optimizing fat loss. Strength training  "is particularly important for building and maintaining muscle mass. Exercise helps reduce stress, increase energy, and improves fitness. Increasing exercise without diet control, however, may not burn enough calories to loose weight.       Start walking three days a week 10-20 minutes at a time    Work towards walking thirty minutes five days a week     Eventually, increase the speed of your walking for 1-2 minutes at time    In addition, we recommend that you review healthy lifestyles and methods for weight loss available through the National Institutes of Health patient information sites:  http://win.niddk.nih.gov/publications/index.htm    And look into health and wellness programs that may be available through your health insurance provider, employer, local community center, or [employer].                  Follow-ups after your visit        Who to contact     If you have questions or need follow up information about today's clinic visit or your schedule please contact [facility] directly at [phone].  Normal or non-critical lab and imaging results will be communicated to you by MyChart, letter or phone within 4 business days after the clinic has received the results. If you do not hear from us within 7 days, please contact the clinic through [employer] or phone. If you have a critical or abnormal lab result, we will notify you by phone as soon as possible.  Submit refill requests through [employer] or call your pharmacy and they will forward the refill request to us. Please allow 3 business days for your refill to be completed.          Additional Information About Your Visit        [employer] Information     [employer] lets you send messages to your doctor, view your test results, renew your prescriptions, schedule appointments and more. To sign up, go to [email or web address] . Click on \"Log in\" on the left side of the screen, which will take you to the Welcome page. Then click on \"Sign up Now\" on " "the right side of the page.     You will be asked to enter the access code listed below, as well as some personal information. Please follow the directions to create your username and password.     Your access code is: [email or web address]  Expires: 2019  4:05 PM     Your access code will  in 90 days. If you need help or a new code, please call your [place] clinic or [phone].        Care EveryWhere ID     This is your Care EveryWhere ID. This could be used by other organizations to access your [place] medical records  [id number]        Your Vitals Were     Pulse Temperature Respirations Height Pulse Oximetry BMI (Body Mass Index)    56 97.8  F (36.6  C) (Oral) 14 1.702 m (5' 7\") 98% 20.83 kg/m2       Blood Pressure from Last 3 Encounters:   10/24/18 133/80   10/04/17 129/74   17 123/73    Weight from Last 3 Encounters:   10/24/18 60.3 kg (133 lb)   10/04/17 57.6 kg (127 lb)   17 58.5 kg (129 lb)              We Performed the Following     Comprehensive DME        Primary Care Provider Office Phone # Fax #    Isai Castro [phone]       [facility] [address]  [facility] [address]        Equal Access to Services     DRISS ROBLES AH: Hadcristal Henry, wabhumika holliday, qaybta lienalamadeo perla. So [facility] [phone].    ATENCIÓN: Si habla español, tiene a fuentes disposición servicios gratuitos de asistencia lingüística. Chase al [phone].    We comply with applicable federal civil rights laws and [place] laws. We do not discriminate on the basis of race, color, national origin, age, disability, sex, sexual orientation, or gender identity.            Thank you!     Thank you for choosing [facility]  for your care. Our goal is always to provide you with excellent care. Hearing back from our patients is one way we can continue to improve our services. Please take a few minutes to complete the " written survey that you may receive in the mail after your visit with us. Thank you!             Your Updated Medication List - Protect others around you: Learn how to safely use, store and throw away your medicines at www.disposemymeds.org.          This list is accurate as of 10/24/18  4:06 PM.  Always use your most recent med list.                   Brand Name Dispense Instructions for use Diagnosis    ATIVAN PO      Take 0.5 mg by mouth as needed        CALCIUM 600 PO      Take 600 mg by mouth daily        calcium polycarbophil 625 MG tablet    FIBERCON     Take 3 tablets by mouth daily        gabapentin 100 MG capsule    NEURONTIN    90 capsule    Take 1 capsule by mouth before typical onset of restlessness. May increase every 5 days by 1 capsule.    Restless legs syndrome (RLS)       MELATONIN PO      Take by mouth At Bedtime        NONFORMULARY      Biotin 2500mcg, Acidolphilus daily        OMEGA-3 FISH OIL PO      Take 1 g by mouth        RED YEAST RICE PO      Take by mouth daily        Urea 40 % Crea      Externally apply topically 2 times daily

## 2018-10-24 NOTE — PROGRESS NOTES
Sleep Study Follow-Up Visit:    Date on this visit: 10/24/2018    Jonna Bloom comes in today for follow-up of her treatment for ANGELA, RLS and insomnia.     PSG  ANGELA: 2004 study at [facility] with AHI of 17.6/hr. Her AHI was 57.7/hr on her back and 0.6/hr off her back.    She has been treating her moderate apnea with a dental appliance and positional restriction. Oximetry in August, 2017 with the dental appliance showed an ERIN of 8/hr. She stopped using the dental appliance regularly about 3-4 months ago because she was developing symptoms of TMJ dysfunction. She presents today thinking she should probably try CPAP again. She tried it about 14 years ago and did not like it. She tried Slumberbump in the past and it was too big.  She has noticed being more sleepy with no treatment of her apnea.   She feels she is congested all of the time. That was part of why she did not tolerate CPAP in the past. She uses Allegra.      She uses 5 mg zolpidem to treat RLS and insomnia. Her ferritin was 72 at [facility] in July. It was 101 on 9/29. She does not feel the restless legs is a significant issue now.   At her last visit in 10/2017, she was prescribed gabapentin for RLS with the hopes of getting her off of zolpidem. She did not try gabapentin because she was afraid of the side effects. She did not return for her follow up. Her ferritin was 89 in July. She is not on iron anymore. She continues to take zolpidem 5 mg, melatonin or rarely trazodone 50 mg. Trazodone makes her dizzy. She takes lorazepam about 4 times per month.    Past medical/surgical history, family history, social history, medications and allergies were reviewed.      Problem List:  Patient Active Problem List    Diagnosis Date Noted     Obstructive sleep apnea 08/04/2015     Priority: Medium     Problem list name updated by automated process. Provider to review       Hamstring muscle strain 04/16/2010     Priority: Medium        Impression/Plan:    (G47.33) Obstructive  sleep apnea  (primary encounter diagnosis)  Comment: She is developing TMJ pain with the dental appliance and did not tolerate the Slumberbump because it was too big. Oximetry with positional restriction alone and dental appliance alone showed a residual ERIN of 8/hr. She would prefer not to use CPAP but is coming to the conclusion it might be her best option.   Plan: Comprehensive DME        Auto CPAP 5-15 cm, heated humidifier and compliance meter. We also talked about positional restriction devices (spent a lot of time looking at alternative devices online but she did not think any were highly likely to work), ENT surgery and whether or not to continue with the dental appliance. She was advised to ask her dentist if they think using it with the setting not as advanced will still hurt her jaw. She was advised to try Flonase if congestion is a problem.    (G25.81) Restless legs syndrome (RLS)  Comment: she does not find the symptoms very bothersome many nights. Sometimes it does still bother her. She says it is not a high priority for her at this time.  Plan: No treatment desired.    (F51.04) Chronic insomnia  Comment: She continues to use zolpidem. Per prior notes, she has an excessive sleep opportunity.  Plan: We discussed the fall risk with sedating medications as well as some evidence that long term use could be linked to dementia. We will work again on CBT at future visits to see if we can get her off of zolpidem.      She will follow up with me in about 2 month(s).     Twenty-five minutes spent with patient, all of which were spent face-to-face counseling, consulting, coordinating plan of care.      Bennett Goltz, PA-C    CC: No ref. provider found

## 2018-10-24 NOTE — PATIENT INSTRUCTIONS
You will be provided with an auto-titrating CPAP with a pressure range of 5-15 cm with heated humidity to limit nasal congestion. Adjust the heat level on humidifier to find a setting that prevents dry nose but does not cause condensation in the hose or mask. Use distilled water in the humidifier.  The CPAP has a ramp function that starts the pressure lower than your prescribed pressure and gradually increases it over a number of minutes.  This may make it easier to fall asleep.    Try to use the CPAP every-night, all night (minimum of 4 hours). Many insurances require that we prove you are using the CPAP at least 4 hours on at least 70% of nights over a 30 day period. We have 90 days to meet those criteria.            Discussed weight management and the impact of weight gain on sleep apnea.  Let me know if you snore or feel the pressure is too high.    You can get new supplies (mask, hose and filter) for your CPAP every 3-6 months, covered by insurance. You do not need to get supplies that often, but they are available if you would like them.  You may exchange the mask once within the first month if you feel the initial mask does not fit well.  Contact your medical equipment provider for equipment issues.  Please let me know if you have any return of snoring, daytime sleepiness or poor sleep quality. We will want to make sure your CPAP is adequately treating your apnea.  There is a website called CPAP.com that has accessories that may make CPAP use easier. Please visit it at your convenience.  Our phone number is [phone].    Follow-up 2 month.  Bring your CPAP machine with you to the follow up appointment.    Your BMI is Body mass index is 20.83 kg/(m^2).  Weight management is a personal decision.  If you are interested in exploring weight loss strategies, the following discussion covers the approaches that may be successful. Body mass index (BMI) is one way to tell whether you are at a healthy weight,  overweight, or obese. It measures your weight in relation to your height.  A BMI of 18.5 to 24.9 is in the healthy range. A person with a BMI of 25 to 29.9 is considered overweight, and someone with a BMI of 30 or greater is considered obese. More than two-thirds of American adults are considered overweight or obese.  Being overweight or obese increases the risk for further weight gain. Excess weight may lead to heart disease and diabetes.  Creating and following plans for healthy eating and physical activity may help you improve your health.  Weight control is part of healthy lifestyle and includes exercise, emotional health, and healthy eating habits. Careful eating habits lifelong are the mainstay of weight control. Though there are significant health benefits from weight loss, long-term weight loss with diet alone may be very difficult to achieve- studies show long-term success with dietary management in less than 10% of people. Attaining a healthy weight may be especially difficult to achieve in those with severe obesity. In some cases, medications, devices and surgical management might be considered.  What can you do?  If you are overweight or obese and are interested in methods for weight loss, you should discuss this with your provider.     Consider reducing daily calorie intake by 500 calories.     Keep a food journal.     Avoiding skipping meals, consider cutting portions instead.    Diet combined with exercise helps maintain muscle while optimizing fat loss. Strength training is particularly important for building and maintaining muscle mass. Exercise helps reduce stress, increase energy, and improves fitness. Increasing exercise without diet control, however, may not burn enough calories to loose weight.       Start walking three days a week 10-20 minutes at a time    Work towards walking thirty minutes five days a week     Eventually, increase the speed of your walking for 1-2 minutes at time    In  addition, we recommend that you review healthy lifestyles and methods for weight loss available through the National Institutes of Health patient information sites:  http://win.niddk.nih.gov/publications/index.htm    And look into health and wellness programs that may be available through your health insurance provider, employer, local community center, or [employer].

## 2018-10-24 NOTE — NURSING NOTE
"Chief Complaint   Patient presents with     Sleep Problem     Would like to discuss cpap therapy       Initial /80  Pulse 56  Temp 97.8  F (36.6  C) (Oral)  Resp 14  Ht 1.702 m (5' 7\")  Wt 60.3 kg (133 lb)  SpO2 98%  BMI 20.83 kg/m2 Estimated body mass index is 20.83 kg/(m^2) as calculated from the following:    Height as of this encounter: 1.702 m (5' 7\").    Weight as of this encounter: 60.3 kg (133 lb).    Medication Reconciliation: complete    ESS 10    Sara Ambrocio MA      "

## 2018-11-01 ENCOUNTER — TELEPHONE (OUTPATIENT)
Dept: SLEEP MEDICINE | Facility: CLINIC | Age: 78
End: 2018-11-01

## 2018-11-01 NOTE — TELEPHONE ENCOUNTER
Patient would like a referral to a dentist that can confirm she has TMJ.     I called Jonna back and left a message asking for a return call. I also wrote a message to her dentist (Malick Alanis DDS) who made her dental appliance asking if he treats TMJ and if he thinks I should send her back to him or to a TMJ disorder specialist.  Bennett Goltz, PA-C

## 2018-11-05 ENCOUNTER — TELEPHONE (OUTPATIENT)
Dept: SLEEP MEDICINE | Facility: CLINIC | Age: 78
End: 2018-11-05

## 2018-11-05 NOTE — TELEPHONE ENCOUNTER
Patient returned your phone call on 11/02/18 at 6:40pm. Please give her a call back at [phone].    Genesis Villeda

## 2018-11-07 ENCOUNTER — TELEPHONE (OUTPATIENT)
Dept: SLEEP MEDICINE | Facility: CLINIC | Age: 78
End: 2018-11-07

## 2018-11-07 ENCOUNTER — NURSE TRIAGE (OUTPATIENT)
Dept: NURSING | Facility: CLINIC | Age: 78
End: 2018-11-07

## 2018-11-07 DIAGNOSIS — R68.84 JAW PAIN: Primary | ICD-10-CM

## 2018-11-07 NOTE — TELEPHONE ENCOUNTER
I am sending a referral to the [facility]. I called and told her I was sending her a referral there. She was advised to call her insurance and make sure they are in network. If they are not in network, she should work with her insurance to find a provider who is in network. She can then contact me to update the referral.   Bennett Goltz, PA-C

## 2018-11-07 NOTE — TELEPHONE ENCOUNTER
Jonna phoned back and is wanting to get diagnosed with TMJ.  Jonna states that she was never diagnosed.  Jonna would prefer not to deal with [facility].  Jonna is working with a therapist to treat  The pain but no one ever said she was diagnosed with TMJ.  Please phone Jonna back at [phone].  Please a name and phone number or detailed message for Jonna.

## 2018-11-07 NOTE — TELEPHONE ENCOUNTER
Clinic Action Needed:  Yes, callback  [facility] Triage Call  Presenting Problem:    Jonna phoned back and is wanting to get diagnosed with TMJ.  Jonna states that she was never diagnosed.  Jonna would prefer not to deal with [facility].  Jonna is working with a therapist to treat  The pain but no one ever said she was diagnosed with TMJ.  Please phone Jonna back at [phone].  Please a name and phone number or detailed message for Jonna.      Routed to:  RN Pool  Please be sure to close this encounter once this patient's issue/question has been addressed.    Candie Mcgrath RN/[facility]

## 2018-11-13 ENCOUNTER — DOCUMENTATION ONLY (OUTPATIENT)
Dept: SLEEP MEDICINE | Facility: CLINIC | Age: 78
End: 2018-11-13
Payer: COMMERCIAL

## 2018-11-13 NOTE — PROGRESS NOTES
Patient was offered choice of vendor and chose [facility].  Patient Jonna Bloom was set up at [place] on November 13, 2018. Patient received a [employer] DreamStation Auto. Pressures were set at 5-15 cm H2O.   Patient s ramp is 5 cm H2O for Auto and FLEX/EPR is A Flex.  Patient received a [employer] Mask name: DREAMWEAR  Nasal mask Size Small, heated tubing and heated humidifier.  Patient is enrolled in the [facility] Program and does need to meet compliance. Patient has a follow up on 1/14/2019 with Bennett Goltz, PA.    Shawn Velásquez

## 2018-11-16 ENCOUNTER — DOCUMENTATION ONLY (OUTPATIENT)
Dept: SLEEP MEDICINE | Facility: CLINIC | Age: 78
End: 2018-11-16

## 2018-11-16 NOTE — PROGRESS NOTES
3 DAY STM VISIT- previously was using alternative therapy.     Diagnostic AHI:17.6 PSG     Patient contacted for 3 day STM visit  Message left for patient to return call     Device type: Auto-CPAP  PAP settings from order::  CPAP min 5 cm  H20       CPAP max 15 cm  H20        Mask type:    Nasal Mask     Device settings from machine      Min CPAP 5            Max CPAP 15       Assessment: Nightly usage over four hours.  Action plan: Pt to have f/u 14 day [facility] visit.  Patient has a follow up visit scheduled:   yes within 31-90 days of set up.

## 2018-11-19 ENCOUNTER — TRANSFERRED RECORDS (OUTPATIENT)
Dept: HEALTH INFORMATION MANAGEMENT | Facility: CLINIC | Age: 78
End: 2018-11-19

## 2018-11-28 ENCOUNTER — DOCUMENTATION ONLY (OUTPATIENT)
Dept: SLEEP MEDICINE | Facility: CLINIC | Age: 78
End: 2018-11-28

## 2018-11-28 NOTE — PROGRESS NOTES
14 DAY STM VISIT    Diagnostic AHI: 17.6      PSG    Subjective measures:       Assessment: Pt meeting objective benchmarks.  Patient meeting subjective benchmarks.  Jonna had multiple questions regarding replacement supplies and cleaning    Action plan: pt to have 30 day STM visit.   Device type: Auto-CPAP  PAP settings: CPAP min 5 cm  H20     CPAP max 15 cm  H20     90th % pressure7 cm  H20    Mask type:   Nasal Mask     Objective measures: 14 day rolling measures         Compliance  92 %      % of night spent in large leak  1 % last  upload      AHI 4.34   last  upload      Average number of minutes 513     Average hours of usage 8.5              Objective measure goal  Compliance   Goal >70%  Leak   Goal < 10%  AHI  Goal < 5  Usage  Goal >240

## 2018-12-14 ENCOUNTER — DOCUMENTATION ONLY (OUTPATIENT)
Dept: SLEEP MEDICINE | Facility: CLINIC | Age: 78
End: 2018-12-14

## 2019-01-24 ENCOUNTER — DOCUMENTATION ONLY (OUTPATIENT)
Dept: SLEEP MEDICINE | Facility: CLINIC | Age: 79
End: 2019-01-24

## 2019-01-24 ENCOUNTER — OFFICE VISIT (OUTPATIENT)
Dept: SLEEP MEDICINE | Facility: CLINIC | Age: 79
End: 2019-01-24
Payer: COMMERCIAL

## 2019-01-24 VITALS
HEART RATE: 48 BPM | HEIGHT: 67 IN | SYSTOLIC BLOOD PRESSURE: 133 MMHG | RESPIRATION RATE: 14 BRPM | OXYGEN SATURATION: 98 % | DIASTOLIC BLOOD PRESSURE: 74 MMHG | BODY MASS INDEX: 20.88 KG/M2 | WEIGHT: 133 LBS

## 2019-01-24 DIAGNOSIS — G47.33 OBSTRUCTIVE SLEEP APNEA: Primary | ICD-10-CM

## 2019-01-24 PROCEDURE — 99214 OFFICE O/P EST MOD 30 MIN: CPT | Performed by: PHYSICIAN ASSISTANT

## 2019-01-24 RX ORDER — ZOLPIDEM TARTRATE 5 MG/1
TABLET ORAL
COMMUNITY
Start: 2019-01-17 | End: 2023-04-11

## 2019-01-24 RX ORDER — RISEDRONATE SODIUM 35 MG/1
TABLET, FILM COATED ORAL
COMMUNITY
Start: 2018-11-12 | End: 2021-09-02

## 2019-01-24 ASSESSMENT — MIFFLIN-ST. JEOR: SCORE: 1114.87

## 2019-01-24 NOTE — PROGRESS NOTES
Patient came to [place] for mask fitting appointment on January 24, 2019. Patient requested to switch masks because soreness/skin irritation . Patient tried on the following masks: Resmed Airfit N20 size large Patient is ineligible until 2/13/2019.    PT CAME INTO THE [place] [facility] WANTING TO TRY ON NEW MASKS.   PT CURRENTLY HAS THE DREAMWEAR NASAL, MED FRAME, SMALL CUSHION.  PT STATES SHE LIKES THE MASK BUT SHE HAS SOME IRRITATION ON THE BRIDGE OF HER NOSE AND SOMETIMES IS TOO CONGESTED FOR THE NASAL OPTION.  PT HAD THE MASK ON REALLY TIGHT, WE REFIT MASK AND IT WAS MORE COMFORTABLE.  PT TRIED ON THE RESMED AIRFIT N20 SIZE LARGE, MASK FIT WELL AND PT LIKED IT.  PT WANTS TO TRY THE RESMED AIRFOT N20 NEXT TIME SHE IS ELIGIBLE 2/13/19.   PT LIKED THE IDEA OF HAVING TWO DIFFERENT MASKS.   PT RECEIVED TWO DISPOSABLE FILTERS.

## 2019-01-24 NOTE — PATIENT INSTRUCTIONS
Your BMI is Body mass index is 20.68 kg/m .  Weight management is a personal decision.  If you are interested in exploring weight loss strategies, the following discussion covers the approaches that may be successful. Body mass index (BMI) is one way to tell whether you are at a healthy weight, overweight, or obese. It measures your weight in relation to your height.  A BMI of 18.5 to 24.9 is in the healthy range. A person with a BMI of 25 to 29.9 is considered overweight, and someone with a BMI of 30 or greater is considered obese. More than two-thirds of American adults are considered overweight or obese.  Being overweight or obese increases the risk for further weight gain. Excess weight may lead to heart disease and diabetes.  Creating and following plans for healthy eating and physical activity may help you improve your health.  Weight control is part of healthy lifestyle and includes exercise, emotional health, and healthy eating habits. Careful eating habits lifelong are the mainstay of weight control. Though there are significant health benefits from weight loss, long-term weight loss with diet alone may be very difficult to achieve- studies show long-term success with dietary management in less than 10% of people. Attaining a healthy weight may be especially difficult to achieve in those with severe obesity. In some cases, medications, devices and surgical management might be considered.  What can you do?  If you are overweight or obese and are interested in methods for weight loss, you should discuss this with your provider.     Consider reducing daily calorie intake by 500 calories.     Keep a food journal.     Avoiding skipping meals, consider cutting portions instead.    Diet combined with exercise helps maintain muscle while optimizing fat loss. Strength training is particularly important for building and maintaining muscle mass. Exercise helps reduce stress, increase energy, and improves fitness.  Increasing exercise without diet control, however, may not burn enough calories to loose weight.       Start walking three days a week 10-20 minutes at a time    Work towards walking thirty minutes five days a week     Eventually, increase the speed of your walking for 1-2 minutes at time    In addition, we recommend that you review healthy lifestyles and methods for weight loss available through the National Institutes of Health patient information sites:  http://win.niddk.nih.gov/publications/index.htm    And look into health and wellness programs that may be available through your health insurance provider, employer, local community center, or [employer].

## 2019-01-24 NOTE — NURSING NOTE
"Chief Complaint   Patient presents with     CPAP Follow Up     Follow up chase, congestion and irritation from nasal pillows       Initial /74   Pulse (!) 48   Resp 14   Ht 1.708 m (5' 7.25\")   Wt 60.3 kg (133 lb)   SpO2 98%   BMI 20.68 kg/m   Estimated body mass index is 20.68 kg/m  as calculated from the following:    Height as of this encounter: 1.708 m (5' 7.25\").    Weight as of this encounter: 60.3 kg (133 lb).    Medication Reconciliation: complete    ESS 2    Sara Ambrocio MA    "

## 2019-01-24 NOTE — PROGRESS NOTES
Sleep Study Follow-Up Visit:    Date on this visit: 1/24/2019    Jonna Bloom comes in today for follow-up of her treatment for ANGELA, RLS and insomnia.       PSG  ANGELA: 2004 study at [place] with AHI of 17.6/hr. Her AHI was 57.7/hr on her back and 0.6/hr off her back.    She had been treating with dental appliance and positional restriction. She was developing TMJ dysfunction so opted to try CPAP at the last visit.     She is on auto CPAP 5-15 cm. She feels it is going pretty well. She would like to try a different mask. It irritates her nose at the septum. She also fidgets at night with concerns that it is leaking. She also often has congestion, which has caused some issues with this mask. She uses Flonase, but it does not help. She is using the Dreamwear nasal mask. She does not know if she snores with it. She does not feel the air is too high or low. She denies dry mouth. She has not noticed a difference in how she feels with CPAP compared to with the dental appliance.    The compliance data shows that s/he has used the CPAP for 29/30 nights, 96.7% of nights for >4 hours.  The 90th% pressure is 6.7 cm.  The average time in large leak is 4 sec.  The average nightly usage is 7:30.  The average AHI is 1.9/hr.    Past medical/surgical history, family history, social history, medications and allergies were reviewed.      Problem List:  Patient Active Problem List    Diagnosis Date Noted     Obstructive sleep apnea 08/04/2015     Priority: Medium     Problem list name updated by automated process. Provider to review       Hamstring muscle strain 04/16/2010     Priority: Medium        Impression/Plan:    (G47.33) Obstructive sleep apnea  (primary encounter diagnosis)  Comment: Brain his apnea appears to be well controlled based on the download.  She has some irritation from the mask at her nasal septum, and is bothered by nasal congestion.  Plan: Continue auto CPAP 5-15 cm.  She will meet with GERD from [facility]  medical after this appointment for mask fitting.  We reviewed recommendations for cleaning and replacing supplies.       She will follow up with me in about 1 year(s).     Twenty-five minutes spent with patient, all of which were spent face-to-face counseling, consulting, coordinating plan of care.      Bennett Goltz, PA-C    CC: No ref. provider found

## 2019-05-17 ENCOUNTER — DOCUMENTATION ONLY (OUTPATIENT)
Dept: SLEEP MEDICINE | Facility: CLINIC | Age: 79
End: 2019-05-17
Payer: COMMERCIAL

## 2019-05-17 NOTE — PROGRESS NOTES
[facility]    Diagnostic AHI: 17.6   PSG    Data only recheck.     Assessment: Pt meeting objective benchmarks.     Action plan:   Pt to follow up per provider request. Patient compliance is 87.2% the last 180 days.    Device type: Auto-CPAP  PAP settings: CPAP min 5 cm  H20     CPAP max 15 cm  H20     90th % pressure 6.4 cm  H20    Objective measures: 14 day rolling measures         Compliance  92 %      AHI 1.19   last  upload      Average number of minutes 415           Objective measure goal  Compliance   Goal >70%  Leak   Goal < 10%  AHI  Goal < 5  Usage  Goal >240

## 2019-07-05 NOTE — PROGRESS NOTES
30 DAY [facility] VISIT    Diagnostic AHI:   PSG AHI: 17.6  ([facility] )         Message left for patient to return call     Device type:   PAP Device: Auto-CPAP ()     Mask type:    Mask Interface: Nasal Mask         Assessment: Pt meeting objective benchmarks.         Action plan: waiting for patient to return call.   Patient has scheduled a follow up visit with Bennett Goltz, PA on 1/14/2019.           Objective measure goal  Compliance   Goal >70%  Leak   Goal < 24 lpm  AHI  Goal < 5  Usage  Goal >240  
No

## 2019-12-17 DIAGNOSIS — G47.33 OBSTRUCTIVE SLEEP APNEA: Primary | ICD-10-CM

## 2020-04-07 VITALS — BODY MASS INDEX: 20.4 KG/M2 | HEIGHT: 67 IN | WEIGHT: 130 LBS

## 2020-04-07 RX ORDER — ROSUVASTATIN CALCIUM 5 MG/1
TABLET, COATED ORAL
COMMUNITY
Start: 2019-12-11

## 2020-04-07 RX ORDER — CHOLECALCIFEROL (VITAMIN D3) 50 MCG
1 TABLET ORAL DAILY
COMMUNITY

## 2020-04-07 RX ORDER — ESCITALOPRAM OXALATE 5 MG/1
TABLET ORAL
COMMUNITY
Start: 2019-09-16 | End: 2021-09-02

## 2020-04-07 ASSESSMENT — MIFFLIN-ST. JEOR: SCORE: 1096.27

## 2020-04-07 NOTE — PROGRESS NOTES
"Jonna Bloom is a 79 year old female who is being evaluated via a billable telephone visit.      The patient has been notified of following:     \"This telephone visit will be conducted via a call between you and your physician/provider. We have found that certain health care needs can be provided without the need for a physical exam.  This service lets us provide the care you need with a short phone conversation.  If a prescription is necessary we can send it directly to your pharmacy.  If lab work is needed we can place an order for that and you can then stop by our lab to have the test done at a later time.    If during the course of the call the physician/provider feels a telephone visit is not appropriate, you will not be charged for this service.\"     Patient has given verbal consent for Telephone visit?  Yes    Jonna Bloom complains of    Chief Complaint   Patient presents with     CPAP Follow Up       I have reviewed and updated the patient's Past Medical History, Social History, Family History and Medication List.    ALLERGIES  Sulfa drugs; Gluten meal; and Penicillins    CPAP Follow-Up Visit:    Date on this visit: 4/8/2020    Jonna Bloom has a follow-up of her treatment for ANGELA, RLS and insomnia.        PSG  ANGELA: 2004 study at [place] with AHI of 17.6/hr. Her AHI was 57.7/hr on her back and 0.6/hr off her back.    She had tried positional restriction and a dental appliance but developed jaw pain.     She occasionally takes 5 mg zolpidem to help her sleep. She has difficulty relaxing. She has had increasing anxiety. Her daughter and her  both caught COVID. She also got caught in the middle of renovation. She has been without a kitchen. She also feels a little nervous about blowing more air into her nose.  She does not know how much she sleeps supine. She tries to prop a pillow behind her, but she tends to want to end up on her back. She also has a tennis ball t-shirt which she wears " inconsistently. She no longer uses the mandibular advancement device.     PAP machine: Respironics. Pressure settings: 5-15 cm.    The compliance data shows that the patient used the CPAP 57% of nights for >4 hours.  The 90th% pressure is 6.8 cm.  The average time in large leak is 0.  The average nightly usage is 448 min.  The average AHI is 0.9/hr.         Interface:  Mask: Dreamwear nasal. She tried an AirFit N20 mask but did not find it as comfortable.   Chin strap: No  Leak: Yes, sometimes wakes from the noise of leak, maybe 3-4 times per night.  Using Humidifier: Yes  Condensation in hose or mask: No     Difficulties with therapy:    [?] Snoring with CPAP: Not sure  [-] Difficulty tolerating the pressure:  [-] Epistaxis/dry nose: was getting bloody nose with nasal sprays. Now resolved.  [+] Nasal congestion: CPAP can overcome it usually.  [-] Dry mouth: no dry/sore throat either, which she used to get before CPAP  [-] Mouth breathing:   [-] Pain/skin breakdown:        She goes to bed very early (8 PM) to escape her anxiety. She wakes around 4 AM and then may doze another hour or two. She has been using zolpidem 5 mg. That works well for her. She had some restless legs when she does not take it as well. She never tried gabapentin but the list of side effects was too scary for her. She very infrequently uses lorazepam. She just started escitalopram about 8 days ago. She feels running has helped her anxiety.      Improvements noted with CPAP:   [-] waking up more refreshed  [-] sleeping better with less arousals  [-] nocturia improved, wakes 2-3 times most nights with or without   [-] improved energy level during the day    Weight change since sleep study:     Past medical/surgical history, family history, social history, medications and allergies were reviewed.      Problem List:  Patient Active Problem List    Diagnosis Date Noted     Obstructive sleep apnea 08/04/2015     Priority: Medium     Problem list name  "updated by automated process. Provider to review       Hamstring muscle strain 04/16/2010     Priority: Medium        Impression/Plan:    (G47.33) Obstructive sleep apnea  (primary encounter diagnosis)  Comment: Jonna uses CPAP sporadically, but when she does she wears it all night.  She was nervous that putting something in her nose would make her at higher risk of the coronavirus.  She also sometimes tries positional restriction devices, mostly homemade.  It is unclear how effective those are at this point.  She says she does have a tendency to end up on her back at some point.  She notices waking from mask leak 3-4 times per night.  Synopsis does not show a leak, but I am skeptical of the synopsis report.  The DreamWear mask likely does not fit her as well as the AirFit N 20, but she seems more comfortable with it.  Plan: Comprehensive DME        I tried to mollify her fears of using CPAP, explaining that using CPAP would not put her at increased risk of catching the coronavirus.  I recommended that she continue to use CPAP 5-15 cm nightly, especially if she is going to sleep supine.  I will request a download from her mask to see if there is elevated leak or not.  If it is leaking, I will recommend that she try the AirFit N20 mask again.  I had her sign up for my chart so I could send her messages.    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  She is concerned that she is \"a goner\" if she catches it, due to her age.  She has close relatives who have contracted it, which heightens her stress and anxiety.  She has started to go to bed at 8 PM to escape the anxiety.  She then wakes at 4 AM and will be in and out of sleep for another 1-2 hours.  She feels the zolpidem 5 mg is quite helpful.  She tried trazodone in the past but did not respond well.  She was scared to try gabapentin due to a long list of potential side " effects.  She recently started escitalopram, which will hopefully help.  Plan: We talked about the importance of keeping a consistent schedule that is appropriately timed with her sleep need.  She was encouraged to only be in bed about 8 hours a night, and try to keep a consistent bedtime and wake time.  Try to avoid sleep in the daytime.  We talked about staying connected with family, getting regular exercise, and practicing relaxation and mindfulness exercises.  I gave her some resources on the after visit summary.  She can continue to take the zolpidem for now as it seems to be working well without side effects.      She will follow up with me in about 1 year(s), sooner as needed.     44 minutes (3:34 PM to 4:18 PM) spent with patient, all of which were spent face-to-face counseling, consulting, coordinating plan of care.      Bennett Goltz, PA-C    CC: Isai Castro MD

## 2020-04-07 NOTE — PATIENT INSTRUCTIONS
"STRESS AND SLEEP    Its no surprise that stress can affect sleep. It is also true that if you sleep better you can also handle stress better.  If left untreated, sleep disorders are also associated with increased risk of onset of depression and anxiety and other health risks.    During this disruptive and uncertain time in our lives due the coronavirus (COVID-19), it is especially important to tend to your emotional and physical wellbeing.   Here are a few tips and resources to consider:    1. Stay active    It s pretty well known that exercise is really good for both our physical and mental health. There s heaps of different types of exercise you can do from home, thanks to [employer] and apps.  Do what fits your needs and health circumstances.    2. Practice good sleep habits      Keep a consistent sleep schedule and allow 7-8 hours for sleep.    Only use your bed for sleep and sex.    Wind down before bed.    Avoid using electronics and mobile devices an hour before bedtime or in bed.    Avoid caffeine use within six hours of your bedtime.     Avoid alcohol use within three hours of your bedtime.    2. Take 5 to chill.    When we re stressed about something (such as coronavirus), our thoughts, heart-rate and breathing tend to speed up. Taking even 5 minutes or so to practice mindfulness can help produce a sense of calmness. What the heck is \"mindfulness\" you ask?  Basically it is any activity, be it meditation, sitting in a relaxed position and taking in the experience and sensations of the present moment, or enjoying relaxed breathing.    If you are experiencing insomnia, the free [employer]  mobile camilo has great relaxation tools in their Tools section including:      Breathing Tool    Progressive Muscle Relaxation    Body Scan    Mindfulness Exercise    Guided Imagery ([place], [place] or [place])    Insight Timer, Calm and Headspace apps have thousands of free guided meditations and exercise on a variety of " "topics that may be of interest such as managing stress, sleep, and meditation.    3. Chat with your friends and family.    Even if an in-person meet-up is off the table, try to stay in touch via text, Skype, Messenger, WhatsApp, FaceTime, or phone call. Ask them how they re feeling and share your own experience if you feel safe to do so.    5. Take a break from the news    Between the news and social media, we are all feeling saturated by news updates and \"breaking news.\"  It s important to stay informed, but try to limit your media intake to a couple of times a day and use trusted news sources. If you catch yourself turning to social media because you re feeling isolated, take a break and spend time on another activity.    6. Listen to Music    Music can make us feel so much better. If you don't have a music playlist on your phone, consider making one.     7. Watch or read something uplifting    Distraction can be a good thing. Watch something that you find uplifting and allow yourself to zone out from what s going on in the world. Lav[employer]oom is a great option too.     8. Learn something new    Have you wanted to get into drawing or learning a musical instrument? Now s a great time to make a start. [employer] has great free online tutorials for pretty much everything.    Stress Management and Relaxation Books      The Relaxation and Stress Reduction Workbook by Laila Gerardo, Fannie Mello and David Krishnan (2008)    Stress Management Workbook: Techniques and Self-Assessment Procedures by Natalie Amaya and Byron Joyce (1997)    A Mindfulness-Based Stress Reduction Workbook by Kavon Whyte and Lory Ibarra (2010)    The Complete Stress Management Workbook by Brendan Pham, Fahad Manuel and Bill Beavers (1996)    Your BMI is Body mass index is 20.21 kg/m .  Weight management is a personal decision.  If you are interested in exploring weight loss strategies, the following discussion covers the " approaches that may be successful. Body mass index (BMI) is one way to tell whether you are at a healthy weight, overweight, or obese. It measures your weight in relation to your height.  A BMI of 18.5 to 24.9 is in the healthy range. A person with a BMI of 25 to 29.9 is considered overweight, and someone with a BMI of 30 or greater is considered obese. More than two-thirds of American adults are considered overweight or obese.  Being overweight or obese increases the risk for further weight gain. Excess weight may lead to heart disease and diabetes.  Creating and following plans for healthy eating and physical activity may help you improve your health.  Weight control is part of healthy lifestyle and includes exercise, emotional health, and healthy eating habits. Careful eating habits lifelong are the mainstay of weight control. Though there are significant health benefits from weight loss, long-term weight loss with diet alone may be very difficult to achieve- studies show long-term success with dietary management in less than 10% of people. Attaining a healthy weight may be especially difficult to achieve in those with severe obesity. In some cases, medications, devices and surgical management might be considered.  What can you do?  If you are overweight or obese and are interested in methods for weight loss, you should discuss this with your provider.     Consider reducing daily calorie intake by 500 calories.     Keep a food journal.     Avoiding skipping meals, consider cutting portions instead.    Diet combined with exercise helps maintain muscle while optimizing fat loss. Strength training is particularly important for building and maintaining muscle mass. Exercise helps reduce stress, increase energy, and improves fitness. Increasing exercise without diet control, however, may not burn enough calories to loose weight.       Start walking three days a week 10-20 minutes at a time    Work towards walking  thirty minutes five days a week     Eventually, increase the speed of your walking for 1-2 minutes at time    In addition, we recommend that you review healthy lifestyles and methods for weight loss available through the National Institutes of Health patient information sites:  http://win.niddk.nih.gov/publications/index.htm    And look into health and wellness programs that may be available through your health insurance provider, employer, local community center, or [employer].    Weight management plan Patient was referred to their PCP to discuss a diet and exercise plan.

## 2020-04-08 ENCOUNTER — VIRTUAL VISIT (OUTPATIENT)
Dept: SLEEP MEDICINE | Facility: CLINIC | Age: 80
End: 2020-04-08
Payer: COMMERCIAL

## 2020-04-08 DIAGNOSIS — G47.33 OBSTRUCTIVE SLEEP APNEA: Primary | ICD-10-CM

## 2020-04-08 DIAGNOSIS — F99 INSOMNIA DUE TO OTHER MENTAL DISORDER: ICD-10-CM

## 2020-04-08 DIAGNOSIS — F51.05 INSOMNIA DUE TO OTHER MENTAL DISORDER: ICD-10-CM

## 2020-04-08 PROCEDURE — 99215 OFFICE O/P EST HI 40 MIN: CPT | Mod: 95 | Performed by: PHYSICIAN ASSISTANT

## 2020-04-08 NOTE — LETTER
"    4/8/2020        RE: Jonna Bloom  [email or web address] [facility] [address]        Jonna Bloom is a 79 year old female who is being evaluated via a billable telephone visit.      The patient has been notified of following:     \"This telephone visit will be conducted via a call between you and your physician/provider. We have found that certain health care needs can be provided without the need for a physical exam.  This service lets us provide the care you need with a short phone conversation.  If a prescription is necessary we can send it directly to your pharmacy.  If lab work is needed we can place an order for that and you can then stop by our lab to have the test done at a later time.    If during the course of the call the physician/provider feels a telephone visit is not appropriate, you will not be charged for this service.\"     Patient has given verbal consent for Telephone visit?  Yes    Jonna Bloom complains of    Chief Complaint   Patient presents with     CPAP Follow Up       I have reviewed and updated the patient's Past Medical History, Social History, Family History and Medication List.    ALLERGIES  Sulfa drugs; Gluten meal; and Penicillins    CPAP Follow-Up Visit:    Date on this visit: 4/8/2020    Jonna Bloom has a follow-up of her treatment for ANGELA, RLS and insomnia.        PSG  ANGELA: 2004 study at [facility] with AHI of 17.6/hr. Her AHI was 57.7/hr on her back and 0.6/hr off her back.    She had tried positional restriction and a dental appliance but developed jaw pain.     She occasionally takes 5 mg zolpidem to help her sleep. She has difficulty relaxing. She has had increasing anxiety. Her daughter and her  both caught COVID. She also got caught in the middle of renovation. She has been without a kitchen. She also feels a little nervous about blowing more air into her nose.  She does not know how much she sleeps supine. She tries to prop a pillow behind her, but she tends " to want to end up on her back. She also has a tennis ball t-shirt which she wears inconsistently. She no longer uses the mandibular advancement device.     PAP machine: [employer]. Pressure settings: 5-15 cm.    The compliance data shows that the patient used the CPAP 57% of nights for >4 hours.  The 90th% pressure is 6.8 cm.  The average time in large leak is 0.  The average nightly usage is 448 min.  The average AHI is 0.9/hr.         Interface:  Mask: Dreamwear nasal. She tried an AirFit N20 mask but did not find it as comfortable.   Chin strap: No  Leak: Yes, sometimes wakes from the noise of leak, maybe 3-4 times per night.  Using Humidifier: Yes  Condensation in hose or mask: No     Difficulties with therapy:    [?] Snoring with CPAP: Not sure  [-] Difficulty tolerating the pressure:  [-] Epistaxis/dry nose: was getting bloody nose with nasal sprays. Now resolved.  [+] Nasal congestion: CPAP can overcome it usually.  [-] Dry mouth: no dry/sore throat either, which she used to get before CPAP  [-] Mouth breathing:   [-] Pain/skin breakdown:        She goes to bed very early (8 PM) to escape her anxiety. She wakes around 4 AM and then may doze another hour or two. She has been using zolpidem 5 mg. That works well for her. She had some restless legs when she does not take it as well. She never tried gabapentin but the list of side effects was too scary for her. She very infrequently uses lorazepam. She just started escitalopram about 8 days ago. She feels running has helped her anxiety.      Improvements noted with CPAP:   [-] waking up more refreshed  [-] sleeping better with less arousals  [-] nocturia improved, wakes 2-3 times most nights with or without   [-] improved energy level during the day    Weight change since sleep study:     Past medical/surgical history, family history, social history, medications and allergies were reviewed.      Problem List:  Patient Active Problem List    Diagnosis Date Noted  "    Obstructive sleep apnea 08/04/2015     Priority: Medium     Problem list name updated by automated process. Provider to review       Hamstring muscle strain 04/16/2010     Priority: Medium        Impression/Plan:    (G47.33) Obstructive sleep apnea  (primary encounter diagnosis)  Comment: Jonna uses CPAP sporadically, but when she does she wears it all night.  She was nervous that putting something in her nose would make her at higher risk of the coronavirus.  She also sometimes tries positional restriction devices, mostly homemade.  It is unclear how effective those are at this point.  She says she does have a tendency to end up on her back at some point.  She notices waking from mask leak 3-4 times per night.  Synopsis does not show a leak, but I am skeptical of the synopsis report.  The DreamWear mask likely does not fit her as well as the AirFit N 20, but she seems more comfortable with it.  Plan: Comprehensive DME        I tried to mollify her fears of using CPAP, explaining that using CPAP would not put her at increased risk of catching the coronavirus.  I recommended that she continue to use CPAP 5-15 cm nightly, especially if she is going to sleep supine.  I will request a download from her mask to see if there is elevated leak or not.  If it is leaking, I will recommend that she try the AirFit N20 mask again.  I had her sign up for my chart so I could send her messages.    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  She is concerned that she is \"a goner\" if she catches it, due to her age.  She has close relatives who have contracted it, which heightens her stress and anxiety.  She has started to go to bed at 8 PM to escape the anxiety.  She then wakes at 4 AM and will be in and out of sleep for another 1-2 hours.  She feels the zolpidem 5 mg is quite helpful.  She tried trazodone in the past but did not " respond well.  She was scared to try gabapentin due to a long list of potential side effects.  She recently started escitalopram, which will hopefully help.  Plan: We talked about the importance of keeping a consistent schedule that is appropriately timed with her sleep need.  She was encouraged to only be in bed about 8 hours a night, and try to keep a consistent bedtime and wake time.  Try to avoid sleep in the daytime.  We talked about staying connected with family, getting regular exercise, and practicing relaxation and mindfulness exercises.  I gave her some resources on the after visit summary.  She can continue to take the zolpidem for now as it seems to be working well without side effects.      She will follow up with me in about 1 year(s), sooner as needed.     44 minutes (3:34 PM to 4:18 PM) spent with patient, all of which were spent face-to-face counseling, consulting, coordinating plan of care.      Bennett Goltz, PA-C    CC: Isai Castro MD                 Sincerely,        Bennett Ezra Goltz, PA-C, DAINA

## 2020-08-14 ENCOUNTER — APPOINTMENT (OUTPATIENT)
Dept: GENERAL RADIOLOGY | Facility: CLINIC | Age: 80
End: 2020-08-14
Attending: EMERGENCY MEDICINE
Payer: COMMERCIAL

## 2020-08-14 ENCOUNTER — HOSPITAL ENCOUNTER (EMERGENCY)
Facility: CLINIC | Age: 80
Discharge: HOME | End: 2020-08-14
Attending: EMERGENCY MEDICINE | Admitting: EMERGENCY MEDICINE
Payer: COMMERCIAL

## 2020-08-14 ENCOUNTER — APPOINTMENT (OUTPATIENT)
Dept: CT IMAGING | Facility: CLINIC | Age: 80
End: 2020-08-14
Attending: EMERGENCY MEDICINE
Payer: COMMERCIAL

## 2020-08-14 VITALS
DIASTOLIC BLOOD PRESSURE: 58 MMHG | HEIGHT: 70 IN | WEIGHT: 130 LBS | TEMPERATURE: 98.3 F | SYSTOLIC BLOOD PRESSURE: 121 MMHG | HEART RATE: 47 BPM | RESPIRATION RATE: 9 BRPM | BODY MASS INDEX: 18.61 KG/M2 | OXYGEN SATURATION: 99 %

## 2020-08-14 DIAGNOSIS — S02.2XXB OPEN FRACTURE OF NASAL BONE, INITIAL ENCOUNTER: ICD-10-CM

## 2020-08-14 DIAGNOSIS — S81.012A LACERATION OF LEFT KNEE, INITIAL ENCOUNTER: ICD-10-CM
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
## 2020-08-14 DIAGNOSIS — T07.XXXA ABRASION, MULTIPLE SITES: ICD-10-CM

## 2020-08-14 DIAGNOSIS — S52.532A CLOSED COLLES' FRACTURE OF LEFT RADIUS, INITIAL ENCOUNTER: ICD-10-CM

## 2020-08-14 PROCEDURE — 99152 MOD SED SAME PHYS/QHP 5/>YRS: CPT

## 2020-08-14 PROCEDURE — 12011 RPR F/E/E/N/L/M 2.5 CM/<: CPT

## 2020-08-14 PROCEDURE — 40000275 ZZH STATISTIC RCP TIME EA 10 MIN

## 2020-08-14 PROCEDURE — U0003 INFECTIOUS AGENT DETECTION BY NUCLEIC ACID (DNA OR RNA); SEVERE ACUTE RESPIRATORY SYNDROME CORONAVIRUS 2 (SARS-COV-2) (CORONAVIRUS DISEASE [COVID-19]), AMPLIFIED PROBE TECHNIQUE, MAKING USE OF HIGH THROUGHPUT TECHNOLOGIES AS DESCRIBED BY CMS-2020-01-R: HCPCS | Performed by: EMERGENCY MEDICINE

## 2020-08-14 PROCEDURE — 70450 CT HEAD/BRAIN W/O DYE: CPT

## 2020-08-14 PROCEDURE — 21310 ZZHC CLOSED TX NASAL BONE FRACTURE W/O MANIPULATION: CPT

## 2020-08-14 PROCEDURE — 90471 IMMUNIZATION ADMIN: CPT

## 2020-08-14 PROCEDURE — 25605 CLTX DST RDL FX/EPHYS SEP W/: CPT | Mod: LT

## 2020-08-14 PROCEDURE — 70486 CT MAXILLOFACIAL W/O DYE: CPT

## 2020-08-14 PROCEDURE — 96374 THER/PROPH/DIAG INJ IV PUSH: CPT | Mod: 59

## 2020-08-14 PROCEDURE — 99291 CRITICAL CARE FIRST HOUR: CPT

## 2020-08-14 PROCEDURE — 40000986 XR WRIST LEFT G/E 3 VIEWS: Mod: LT

## 2020-08-14 PROCEDURE — 73562 X-RAY EXAM OF KNEE 3: CPT | Mod: LT

## 2020-08-14 PROCEDURE — 96375 TX/PRO/DX INJ NEW DRUG ADDON: CPT

## 2020-08-14 PROCEDURE — C9803 HOPD COVID-19 SPEC COLLECT: HCPCS

## 2020-08-14 PROCEDURE — 73110 X-RAY EXAM OF WRIST: CPT | Mod: LT

## 2020-08-14 PROCEDURE — 25000128 H RX IP 250 OP 636: Performed by: EMERGENCY MEDICINE

## 2020-08-14 PROCEDURE — 12002 RPR S/N/AX/GEN/TRNK2.6-7.5CM: CPT

## 2020-08-14 PROCEDURE — 90715 TDAP VACCINE 7 YRS/> IM: CPT | Performed by: EMERGENCY MEDICINE

## 2020-08-14 PROCEDURE — 40000278 XR SURGERY CARM FLUORO LESS THAN 5 MIN

## 2020-08-14 RX ORDER — ONDANSETRON 2 MG/ML
4 INJECTION INTRAMUSCULAR; INTRAVENOUS ONCE
Status: COMPLETED | OUTPATIENT
Start: 2020-08-14 | End: 2020-08-14

## 2020-08-14 RX ORDER — PROPOFOL 10 MG/ML
60 INJECTION, EMULSION INTRAVENOUS ONCE
Status: COMPLETED | OUTPATIENT
Start: 2020-08-14 | End: 2020-08-14

## 2020-08-14 RX ORDER — PROPOFOL 10 MG/ML
60 INJECTION, EMULSION INTRAVENOUS
Status: DISCONTINUED | OUTPATIENT
Start: 2020-08-14 | End: 2020-08-14 | Stop reason: HOSPADM

## 2020-08-14 RX ORDER — HYDROCODONE BITARTRATE AND ACETAMINOPHEN 5; 325 MG/1; MG/1
1-2 TABLET ORAL EVERY 6 HOURS PRN
Qty: 12 TABLET | Refills: 0 | Status: SHIPPED | OUTPATIENT
Start: 2020-08-14 | End: 2021-09-02

## 2020-08-14 RX ORDER — MORPHINE SULFATE 4 MG/ML
4 INJECTION, SOLUTION INTRAMUSCULAR; INTRAVENOUS ONCE
Status: COMPLETED | OUTPATIENT
Start: 2020-08-14 | End: 2020-08-14

## 2020-08-14 RX ORDER — CEPHALEXIN 500 MG/1
500 CAPSULE ORAL 4 TIMES DAILY
Qty: 20 CAPSULE | Refills: 0 | Status: SHIPPED | OUTPATIENT
Start: 2020-08-14 | End: 2020-08-19

## 2020-08-14 RX ORDER — MORPHINE SULFATE 4 MG/ML
4 INJECTION, SOLUTION INTRAMUSCULAR; INTRAVENOUS ONCE
Status: DISCONTINUED | OUTPATIENT
Start: 2020-08-14 | End: 2020-08-14 | Stop reason: HOSPADM

## 2020-08-14 RX ADMIN — MORPHINE SULFATE 4 MG: 4 INJECTION, SOLUTION INTRAMUSCULAR; INTRAVENOUS at 12:02

## 2020-08-14 RX ADMIN — CLOSTRIDIUM TETANI TOXOID ANTIGEN (FORMALDEHYDE INACTIVATED), CORYNEBACTERIUM DIPHTHERIAE TOXOID ANTIGEN (FORMALDEHYDE INACTIVATED), BORDETELLA PERTUSSIS TOXOID ANTIGEN (GLUTARALDEHYDE INACTIVATED), BORDETELLA PERTUSSIS FILAMENTOUS HEMAGGLUTININ ANTIGEN (FORMALDEHYDE INACTIVATED), BORDETELLA PERTUSSIS PERTACTIN ANTIGEN, AND BORDETELLA PERTUSSIS FIMBRIAE 2/3 ANTIGEN 0.5 ML: 5; 2; 2.5; 5; 3; 5 INJECTION, SUSPENSION INTRAMUSCULAR at 12:04

## 2020-08-14 RX ADMIN — ONDANSETRON 4 MG: 2 INJECTION INTRAMUSCULAR; INTRAVENOUS at 11:57

## 2020-08-14 RX ADMIN — PROPOFOL 80 MG: 10 INJECTION, EMULSION INTRAVENOUS at 14:31

## 2020-08-14 ASSESSMENT — MIFFLIN-ST. JEOR: SCORE: 1139.93

## 2020-08-14 ASSESSMENT — ENCOUNTER SYMPTOMS: HEADACHES: 0

## 2020-08-14 NOTE — ED TRIAGE NOTES
Pt was out jogging when she tripped on the roadway, falling. Pt has an obvious deformed left wrist, lac to bridge of the nose, gash to the left knee. EMS gave 2 0.5mg of dilaudid and 500 NS. Pt is not on blood thinners.

## 2020-08-14 NOTE — DISCHARGE INSTRUCTIONS
Follow-up with ENT regarding your nasal fracture.  The stitches will need to be removed in 5 days.  Take antibiotics for the overlying infection as instructed.  Use ice packs to help minimize swelling.  Regarding the wrist you have a surgery at Monday at [facility].  You will need to arrive at 2:15 PM.  Nothing to eat after midnight.  The stitches in your left knee will need to be removed in 8 to 10 days.  This can happen with primary care or upon any follow-up appointment with a provider.  Return immediately with uncontrolled pain, numbness or tingling, evidence of any of your wounds becoming infected, or any other new or concerning symptoms.

## 2020-08-14 NOTE — ED AVS SNAPSHOT
Emergency Department  [address] [place] [id number]  Phone:  [phone]  Fax:  [phone]                                    Jonna Bloom   MRN: [id number]    Department:   Emergency Department   Date of Visit:  8/14/2020           After Visit Summary Signature Page    I have received my discharge instructions, and my questions have been answered. I have discussed any challenges I see with this plan with the nurse or doctor.    ..........................................................................................................................................  Patient/Patient Representative Signature      ..........................................................................................................................................  Patient Representative Print Name and Relationship to Patient    ..................................................               ................................................  Date                                   Time    ..........................................................................................................................................  Reviewed by Signature/Title    ...................................................              ..............................................  Date                                               Time          22EPIC Rev 08/18

## 2020-08-14 NOTE — ED NOTES
Bed: ED23  Expected date:   Expected time:   Means of arrival:   Comments:  [facility] [place] - [phone] - [phone] F fall head knee wrist pain negative covid eta 1111

## 2020-08-14 NOTE — ED NOTES
Patient had a brief period of apnea and low 02 saturation.  Bag valve mask applied and patient given breaths for aprox 1min.

## 2020-08-14 NOTE — ED PROVIDER NOTES
History     Chief Complaint:  Fall; Wrist Pain; and Laceration    The history is provided by the patient.      Jonna Bloom is a right handed 79 year old female who presents with a mechanical fall (tripped on uneven cement while jogging) resulting in injury to her wrist, knee, and face. Jonna did hit her face had and has a laceration to her nose, but denies loss of consciousness or headache. She tried to catch herself with her outstretched hand and now has pain and deformity to her left wrist. She also hit her knees and finds the left to be painful. Her last Td/Tdap was in 2012. EMS gave 0.5 mg Dilaudid.    Allergies:  Sulfa Drugs  Penicillins    Medications:    Crestor  Ambien  Actonel  Lexapro    Past Medical History:    Arthritis  Basal cell carcinoma  Chronic rhinitis  Depression Major  Endometriosis  Gastro-oesophageal reflux disease  Generalized anxiety disorder  Gluten intolerance  Hyperlipidemia   Insomnia  Osteoporosis  Post traumatic stress disorder  Sleep apnea    Past Surgical History:    Arthroplasty finger (R)  Breast augmentation  Cholecystectomy  Tubal ligation, Ovarian resection  Hand surgery  Excision basal cell cancer    Family History:    Suicide (brother)  Stroke (Father)  Emphysema (Mother)  Colon cancer (Brother)  Alcohol abuse (Father)  Type II diabetes mellitus (Father)  Heart disease (Father)    Social History:  Smoking status: Never  Alcohol use: Yes, 5 glasses/week  Drug use: No  PCP: Isai Castro  Marital Status:     Presents via EMS with her son at bedside.     Review of Systems   HENT: Positive for facial swelling.    Musculoskeletal: Positive for arthralgias (left wrist, knee).   Skin: Positive for wound (Both knees, face).   Neurological: Negative for syncope and headaches.   All other systems reviewed and are negative.    Physical Exam     Patient Vitals for the past 24 hrs:   BP Temp Temp src Pulse Heart Rate Resp SpO2 Height Weight   08/14/20 1455 121/60 -- --  "51 52 21 96 % -- --   08/14/20 1450 -- -- -- -- -- 10 -- -- --   08/14/20 1450 127/62 -- -- (!) 48 (!) 48 24 92 % -- --   08/14/20 1445 120/79 -- -- 53 50 12 96 % -- --   08/14/20 1440 110/88 -- -- 52 53 13 96 % -- --   08/14/20 1435 138/65 -- -- (!) 49 52 10 93 % -- --   08/14/20 1430 (!) 142/77 -- -- 52 50 9 100 % -- --   08/14/20 1330 119/81 -- -- (!) 49 -- -- 96 % -- --   08/14/20 1300 121/64 -- -- 52 -- -- 92 % -- --   08/14/20 1123 (!) 147/119 98.3  F (36.8  C) Oral 63 -- 16 100 % 1.778 m (5' 10\") 59 kg (130 lb)       Physical Exam  General: Well-developed and well-nourished. Well appearing elderly  woman. Cooperative.  Head:  No calvarial trauma noted.  Eyes:  Conjunctivae, lids, and sclerae are normal.  ENT:    No septal hematoma. 2 cm V-shaped laceration on the bridge of the nose with early surrounding edema and early periorbital ecchymosis. Moist mucous membranes.  Neck:  Supple. Normal range of motion. No midline tenderness.  CV:  Regular rate and rhythm. Normal heart sounds with no murmurs, rubs, or gallops detected. 2+ left radial pulse.  Resp:  No respiratory distress. Clear to auscultation bilaterally without decreased breath sounds, wheezing, rales, or rhonchi.  GI:  Soft. Non-distended. Non-tender.    MS:  Deformity to the left wrist resulting in decreased ROM. Tenderness over the distal forearm. Tenderness to palpation surrounding left anterior knee laceration. No significant bony tenderness elsewhere. No bilateral lower extremity edema.  Skin:  Warm. Non-diaphoretic. No pallor. Nasal laceration as above. Small abrasion on the base of the palm of the right hand. Abrasion on the anterior right knee. Hole-like laceration to the anterior left knee measuring 3 cm in diameter.  Neuro:  Awake. A&Ox3. Normal strength. Able to wiggle fingers on left hand with sensation intact to light touch. No evidence of tendon injury.  Psych: Normal mood and affect. Normal speech.  Vitals reviewed.    Emergency " Department Course   Imaging:  Radiographic findings were communicated with the patient who voiced understanding of the findings.    CT Head w/o Contrast  No intracranial bleed or skull fractures.   As read by Radiology.    CT Facial Bones without Contrast  1. Fractures of the nasal bones. No significant displacement.   2. No other facial fractures are identified.   As read by Radiology.    XR Knee Left 3 Views  No bony or soft tissue abnormality. No joint space   effusion.   As read by Radiology.    XR Wrist Left G/E 3 Views (pre-reduction)  There is a transverse mildly comminuted and probably   intra-articular fracture of the distal radial metaphysis with up to 8   mm dorsal displacement but no significant angulation. A slightly   distracted fracture of the ulnar styloid tip is also present. Mild   widening of the scapholunate interval. Dorsal tilting of the lunate   bone could indicate dorsal intercalated segmental instability but it   is difficult to assess the relative position of the scaphoid to the   lunate on the lateral view. No additional acute fractures are seen.   Prior surgical resection of the trapezium. Prior arthrodesis of the   first metacarpophalangeal joint. Soft tissue swelling about the wrist.   As read by Radiology.    XR Wrist Left G/E 3 Views (post-reduction)  Interval placement of a plaster cast obscuring fine bone   detail. Previously seen distal radial metaphyseal fracture shows   partial reduction. A few millimeter dorsal displacement probably   remain. Distal radial articular surface appears to be in the neutral   position. Known ulnar styloid fracture is difficult to appreciate due   to the overlying cast.     Laboratory:  Asymptomatic COVID-19 Virus PCR: Pending    [facility]    -Fracture    Date/Time: 8/14/2020 1:37 PM  Performed by: Latsaha Salazar MD  Authorized by: Latasha Salazar MD     ED EVALUATION:      I have performed an Emergency Department Evaluation  including taking a history and physical examination, this evaluation will be documented in the electronic medical record for this ED encounter.      Provisional Diagnosis: Fracture    ASA Class: Class 2- mild systemic disease, no acute problems, no functional limitations    NPO Status: not NPO, emergent situation  UNIVERSAL PROTOCOL   Site Marked: NA  Prior Images Obtained and Reviewed:  Yes  Required items: Required blood products, implants, devices and special equipment available    Patient identity confirmed:  Verbally with patient and provided demographic data  Patient was reevaluated immediately before administering moderate or deep sedation or anesthesia  Confirmation Checklist:  Patient's identity using two indicators, relevant allergies, procedure was appropriate and matched the consent or emergent situation and correct equipment/implants were available  Time out: Immediately prior to the procedure a time out was called    Universal Protocol: the Joint Commission Universal Protocol was followed          INJURY      Injury location:  Forearm    Forearm injury location:  L forearm    Forearm fracture type: distal radius and ulnar styloid      PRE PROCEDURE ASSESSMENT      Neurological function: normal      Distal perfusion: normal      Range of motion: reduced      SEDATION    Patient Sedated: Yes    Sedation Type:  Moderate (conscious) sedation  Sedation:  Propofol  Vital signs: Vital signs monitored during sedation      ANESTHESIA (see MAR for exact dosages)      Anesthesia method:  None    PROCEDURE DETAILS:     Manipulation performed: yes      Skin traction used: no      Skeletal traction used: no      Pin inserted: no      Reduction successful: yes      X-ray confirmed reduction: yes      Immobilization:  Splint    Splint type:  Sugar tong    Supplies used:  Plaster    POST PROCEDURE ASSESSMENT      Neurological function: normal      Distal perfusion: normal      Range of motion: unchanged      PROCEDURE    Patient Tolerance:  Patient tolerated the procedure well with no immediate complications    Patient complications:  Other (see comment)   Oxygen desaturation requiring brief bag valve mask ventilation  Length of time physician/provider present for 1:1 monitoring during sedation: 10      Laceration Repair        LACERATION:  A subcutaneous and stellate clean 2 cm laceration.      LOCATION:  Bridge of nose      FUNCTION:  Distally sensation and circulation are intact.      ANESTHESIA:  Local using lidocaine with epinephrine total of 1 mLs      PREPARATION:  Irrigation and scrubbing with normal saline and Shur Clens      DEBRIDEMENT:  No debridement      CLOSURE:  Wound was closed with one layer.  Skin closed with 7 x 6.0 Ethylon using interrupted sutures.      Laceration Repair        LACERATION:  A subcutaneous and stellate (hole-like) minimally contaminated 3 cm in diameter laceration.      LOCATION:  Left knee      FUNCTION:  Distally sensation, circulation, motor and tendon function are intact.      ANESTHESIA:  Local using 1% lidocaine with epinephrine total of 3 mLs      PREPARATION:  Irrigation and scrubbing with normal saline and Shur Clens      DEBRIDEMENT:  Minimal debridement      CLOSURE:  Wound was closed with one layer.  Skin closed with 4 x 4.0 Ethylon using interrupted sutures and 1 x 4.0 Ethylon using horizontal mattress sutures.    Interventions:  1157: Zofran 4 mg IV  1202: Morphine 4 mg IV  1204: Adacel 0.5 mL IM  1431: Propofol 80 mg IV     Emergency Department Course:  Past medical records, nursing notes, and vitals reviewed.  1130: I performed an exam of the patient and obtained history, as documented above.  The patient was sent for a head CT, facial bones CT, and left knee and wrist x-rays while in the emergency department, findings above.  The patient's nose and throat were swabbed and this sample was sent for COVID testing, findings above.     1316: I rechecked the patient and anesthetized  "her for her laceration repairs. See notes above.    1326: I spoke with Dr. Smith of hand surgery.     1429: I performed a sedation and fracture reduction. See note above.    1440: I performed two laceration repairs. See notes above.    1600: I discussed the patient's case with Dr. Smith, hand surgery.    1635: I updated the patient who is feeling comfortable with plan for discharge.  She has tolerated PO and trial of ambulation.    Findings and plan explained to the Patient. Patient discharged home with instructions regarding supportive care, medications, and reasons to return. The importance of close follow-up was reviewed.     Impression & Plan      Medical Decision Making:  Jonna is an 79-year-old female who had a mechanical trip and fall while jogging just prior to arrival sustaining abrasion/laceration to both knees, nasal laceration, and right wrist injury with deformity.  She denies loss of consciousness and appears well aside from her traumatic injuries as noted above.  She has no neck pain or midline tenderness and CT imaging of the cervical spine is not indicated.  However, I did obtain CT of the head given her advanced age which, fortunately, reveals no intracranial hemorrhage or skull fracture.  CT of the face reveals only nondisplaced nasal bone fractures as expected given physical exam.  X-ray of the left knee reveals no acute fracture or malalignment.  Left wrist film does confirm a transverse comminuted intra-articular fracture of the distal radial metaphysis with dorsal displacement and a ulnar styloid tip fracture.  I discussed the patient's case with Dr. Smith, hand surgery, who recommends a reduction and short-term surgical intervention.  Conscious sedation and closed reduction of the left wrist was performed as documented above which the patient tolerated well.  I also cleaned and repaired lacerations on her nose and left knee and updated her tetanus.  Postreduction films show \"partial " "reduction\" with improved anatomic alignment in her sugar tong splint.  I obtained a preoperative asymptomatic COVID-19 PCR at the request of Dr. Smith.      Jonna did require morphine and Zofran before her sedation but afterwards felt improved.  She was able to tolerate PO and ambulate without difficulty and is appropriate for discharge.  The laceration on her left knee was a somewhat gaping hole with some tension on the sutures.  As such I placed her in a Ace bandage to prevent full flexion and further suture tension.  She understands wound care and suture removal for both the knee and her nose and I also provided her with ENT information to follow-up regarding her nasal fracture.  Because she does have overlying laceration I will give her antibiotics for open fracture.  I encouraged her to use ice packs to minimize swelling.  She was provided information regarding plan for surgery on Monday at [facility] with Dr. Smith.  I encouraged her to return in the interim should she have any new concerns including, but not limited to, wound infection or severe pain, and I answered all her and her son's questions.  They verbalized understanding and are amenable to discharge.    Covid-19  Jonna Bloom was evaluated during a global COVID-19 pandemic, which necessitated consideration that the patient might be at risk for infection with the SARS-CoV-2 virus that causes COVID-19.   Applicable protocols for evaluation were followed during the patient's care.   COVID-19 was considered as part of the patient's evaluation. The plan for testing is:  a test was obtained during this visit.    Diagnosis:    ICD-10-CM    1. Fall on same level from tripping  W01.0XXA    2. Open fracture of nasal bone, initial encounter  S02.2XXB    3. Closed Colles' fracture of left radius, initial encounter  S52.532A    4. Laceration of left knee, initial encounter  S81.012A    5. Abrasion, multiple sites  T07.XXXA        Disposition:  discharged " home    Discharge Medications:  Discharge Medication List as of 8/14/2020  4:29 PM      START taking these medications    Details   cephALEXin (KEFLEX) 500 MG capsule Take 1 capsule (500 mg) by mouth 4 times daily for 5 days, Disp-20 capsule,R-0, Local Print      HYDROcodone-acetaminophen (NORCO) 5-325 MG tablet Take 1-2 tablets by mouth every 6 hours as needed for pain, Disp-12 tablet,R-0, Local Print             Heath Teixeira  8/14/2020    EMERGENCY DEPARTMENT  Mao PERALES, am serving as a scribe at 12:17 PM on 8/14/2020 to document services personally performed by Latasha Salazar MD based on my observations and the provider's statements to me.     Heath PERALES, am serving as a scribe at 11:47 AM on 8/14/2020 to document services personally performed by Latasha Salazar MD based on my observations and the provider's statements to me.      Latasha Salazar MD  08/15/20 0754

## 2020-08-15 LAB
SARS-COV-2 RNA SPEC QL NAA+PROBE: NOT DETECTED
SPECIMEN SOURCE: NORMAL

## 2020-08-15 ASSESSMENT — ENCOUNTER SYMPTOMS
ARTHRALGIAS: 1
WOUND: 1
FACIAL SWELLING: 1

## 2020-08-19 ENCOUNTER — TELEPHONE (OUTPATIENT)
Dept: SLEEP MEDICINE | Facility: CLINIC | Age: 80
End: 2020-08-19

## 2020-08-19 NOTE — TELEPHONE ENCOUNTER
CALLED PT AND DISCUSS THE STYLE OF MASK SHE WAS LOOKING FOR. THE PT STATED THAT A FFM. DESCRIBE TO THE PT WERE THAT MASK WILL SIT ON HER FACE. I ASKED THE PT IF PILLOW MASK WOULD WORK OUR AS IT DOES NOT TOUCH MUCH SURFACE AREA ON THE FACE. ASKED THE PT HAS SHE TALKED WITH HER DR. ABOUT THE HER CURRENT CPAP MASK AND SHE STATED SHE HAS NOT. SHE STATED THAT SHE IS ON HER WAY TO THE DR. REGARDING HER BROKEN NOSE. I SUGGESTED ASKING HER PROVIDER ABOUT HER CPAP MASK AND THEN WE CAN GO FROM THERE AS WE DO NOT WANT TO IMPEDE THE HEALING PROCESS. PT THOUGHT IT WAS A GOOD IDEA AND WILL REACH OUT IF A MASK FITTING CONSULT IS STILL NEEDED.

## 2021-01-10 ENCOUNTER — HEALTH MAINTENANCE LETTER (OUTPATIENT)
Age: 81
End: 2021-01-10

## 2021-06-21 ENCOUNTER — TELEPHONE (OUTPATIENT)
Dept: SLEEP MEDICINE | Facility: CLINIC | Age: 81
End: 2021-06-21

## 2021-06-21 NOTE — TELEPHONE ENCOUNTER
Reason for call:  Other   Patient called regarding (reason for call): call back  Additional comments: Patient requesting a call back to discuss a recall on CPAP would like a call back to discuss more     Phone number to reach patient:  Home number on file [phone] (home) or Cell number on file:    Telephone Information:   [phone]        Best Time:  Anytime    Can we leave a detailed message on this number?  YES    Travel screening: Not Applicable

## 2021-06-25 NOTE — TELEPHONE ENCOUNTER
Returning patients phone call, she was advised to contact [facility] or [employer] in regards to the cpap recall. Patient was given phone number to [facility].     Sara Ambrocio MA

## 2021-09-01 ASSESSMENT — SLEEP AND FATIGUE QUESTIONNAIRES
HOW LIKELY ARE YOU TO NOD OFF OR FALL ASLEEP WHILE SITTING AND READING: WOULD NEVER DOZE
HOW LIKELY ARE YOU TO NOD OFF OR FALL ASLEEP WHEN YOU ARE A PASSENGER IN A CAR FOR AN HOUR WITHOUT A BREAK: WOULD NEVER DOZE
HOW LIKELY ARE YOU TO NOD OFF OR FALL ASLEEP WHILE SITTING QUIETLY AFTER LUNCH WITHOUT ALCOHOL: WOULD NEVER DOZE
HOW LIKELY ARE YOU TO NOD OFF OR FALL ASLEEP WHILE WATCHING TV: SLIGHT CHANCE OF DOZING
HOW LIKELY ARE YOU TO NOD OFF OR FALL ASLEEP IN A CAR, WHILE STOPPED FOR A FEW MINUTES IN TRAFFIC: WOULD NEVER DOZE
HOW LIKELY ARE YOU TO NOD OFF OR FALL ASLEEP WHILE SITTING INACTIVE IN A PUBLIC PLACE: WOULD NEVER DOZE
HOW LIKELY ARE YOU TO NOD OFF OR FALL ASLEEP WHILE LYING DOWN TO REST IN THE AFTERNOON WHEN CIRCUMSTANCES PERMIT: MODERATE CHANCE OF DOZING
HOW LIKELY ARE YOU TO NOD OFF OR FALL ASLEEP WHILE SITTING AND TALKING TO SOMEONE: WOULD NEVER DOZE

## 2021-09-02 ENCOUNTER — VIRTUAL VISIT (OUTPATIENT)
Dept: SLEEP MEDICINE | Facility: CLINIC | Age: 81
End: 2021-09-02
Payer: COMMERCIAL

## 2021-09-02 VITALS — WEIGHT: 133 LBS | BODY MASS INDEX: 19.08 KG/M2

## 2021-09-02 DIAGNOSIS — G47.33 OBSTRUCTIVE SLEEP APNEA: Primary | ICD-10-CM

## 2021-09-02 PROCEDURE — 99215 OFFICE O/P EST HI 40 MIN: CPT | Mod: 95 | Performed by: PHYSICIAN ASSISTANT

## 2021-09-02 NOTE — LETTER
9/2/2021        RE: Jonna Bloom  [address]  [place] [facility] [id number]        CPAP Follow-Up Visit:    Date on this visit: 9/2/2021    Jonna Bloom has a follow-up of her treatment for ANGELA, RLS and insomnia.        PSG  ANGELA: 2004 study at [facility] with AHI of 17.6/hr. Her AHI was 57.7/hr on her back and 0.6/hr off her back.     She had tried positional restriction and a dental appliance but developed jaw pain.     She has questions about travelling to [place] frequently to live by her family. That would be at an elevation at 9500 [place]. She was there for 10 days in July used CPAP and asks how it went. She noticed lower oxygen in the day, 92-95% compared to 97-99% in [place].     She has other questions about the recall. She has had difficulty registering her machine on the website and couldn't get through to anyone on the phone. She has not used an ozone sterilizing device. She has not noticed any particles.      PAP machine: [employer] (obtained 11/13/2018). Pressure settings: 5-15 cm    The compliance data shows that the patient used the CPAP for 71% of nights for >4 hours.  The 90th% pressure is 6.8 cm.  The average time in large leak is 0.  The average nightly usage is 351 min.  The average AHI is 2.8/hr.    We looked at a download from July when at altitude. There was no difference compared to download recently in [place].        Interface:  Mask: Dreamwear nasal mask. N20 was not comfortable. Sometimes the mask bothers her, particularly if congestion. She wears a tennis-ball t-shirt when she does not use CPAP. She does feel it helps keep her off her back.   Chin strap: No  Leak: No  Using Humidifier: Yes  Condensation in hose or mask: No     Difficulties with therapy:    [?] Snoring with CPAP: not observed often  [-] Difficulty tolerating the pressure:  [-] Epistaxis/dry nose:   [+] Nasal congestion: occasional  [-] Dry mouth:  [-] Mouth breathing:   [-] Pain/skin breakdown:      Improvements noted with  CPAP: She does not notice a difference.      Weight change since sleep study:  No change in the last year    Bedtime: 8:30-9 PM.  Wake time: 4-6 AM. Wakes 1-2 times per night. She takes zolpidem about every third night.      Past medical/surgical history, family history, social history, medications and allergies were reviewed.      Problem List:  Patient Active Problem List    Diagnosis Date Noted     Obstructive sleep apnea 08/04/2015     Priority: Medium     Problem list name updated by automated process. Provider to review       Hamstring muscle strain 04/16/2010     Priority: Medium        Impression/Plan:    (G47.33) Obstructive sleep apnea  (primary encounter diagnosis)  Comment: Jonna uses CPAP although feels no benefit. Her apnea was profoundly positional. She sometimes gets fed up with the mask and will remove it, or will have congestion preventing use on an occasional night. Overall, she is meeting compliance and her download looks very good. If she does not use the CPAP, she uses a tennis ball t-shirt to help prevent supine sleep. She asks about a replacement CPAP today because her machine has been recalled. She has tried to register with [employer] but has struggled to get the website working properly or get through to anyone on the phone. She is encouraged by her daughter to come live with her in [place] at [place]000 ft for 3-4 months of the year. Jonna asks if that would be a healthy choice.   Plan: Comprehensive DME        An order was placed for a replacement auto CPAP 5-15 cm, due to the recall. New supplies were also ordered. I will have one of our sleep therapy management staff call her to help her get her machine registered. I looked up some research on elderly living at altitude and called her back to review the findings. Most articles suggest elderly folks acclimate no differently and may experience fewer symptoms of acute mountain sickness. In the long term, mortality due to heart disease,  stroke and cancer may be reduced, but folks with COPD or other lung disease may have lower longevity. She is healthy and I see no contraindications for her staying there for lengthy periods. Her download over the week that she was there in July did not show evidence of periodic breathing, so her apnea should not be a contraindication. I suggested she also get the opinion of her primary.     She will follow up with me in about 1 year(s).     40 minutes were spent on the date of the encounter doing chart review, history and exam, documentation and further activities as noted above.     Bennett Goltz, PA-C    CC: Isai Castro MD             Jonna is a 81 year old who is being evaluated via a billable video visit.      How would you like to obtain your AVS? Mail a copy  If the video visit is dropped, the invitation should be resent by: Send to e-mail at: [email or web address]"  Will anyone else be joining your video visit? No      Video Start Time: 3:18 PM  Video-Visit Details    Type of service:  Video Visit    Video End Time:3:51 PM    Originating Location (pt. Location): Home    Distant Location (provider location):  [facility] SLEEP [facility]     Platform used for Video Visit: Reva        Sincerely,        Bennett Ezra Goltz, PA-C, DAINA

## 2021-09-02 NOTE — PROGRESS NOTES
CPAP Follow-Up Visit:    Date on this visit: 9/2/2021    Jonna Bloom has a follow-up of her treatment for ANGELA, RLS and insomnia.        PSG  ANGELA: 2004 study at [place] with AHI of 17.6/hr. Her AHI was 57.7/hr on her back and 0.6/hr off her back.     She had tried positional restriction and a dental appliance but developed jaw pain.     She has questions about travelling to [place] frequently to live by her family. That would be at an elevation at 9500 feet. She was there for 10 days in July used CPAP and asks how it went. She noticed lower oxygen in the day, 92-95% compared to 97-99% in [place].     She has other questions about the recall. She has had difficulty registering her machine on the website and couldn't get through to anyone on the phone. She has not used an ozone sterilizing device. She has not noticed any particles.      PAP machine: [employer] (obtained 11/13/2018). Pressure settings: 5-15 cm    The compliance data shows that the patient used the CPAP for 71% of nights for >4 hours.  The 90th% pressure is 6.8 cm.  The average time in large leak is 0.  The average nightly usage is 351 min.  The average AHI is 2.8/hr.    We looked at a download from July when at altitude. There was no difference compared to download recently in [place].        Interface:  Mask: Dreamwear nasal mask. N20 was not comfortable. Sometimes the mask bothers her, particularly if congestion. She wears a tennis-ball t-shirt when she does not use CPAP. She does feel it helps keep her off her back.   Chin strap: No  Leak: No  Using Humidifier: Yes  Condensation in hose or mask: No     Difficulties with therapy:    [?] Snoring with CPAP: not observed often  [-] Difficulty tolerating the pressure:  [-] Epistaxis/dry nose:   [+] Nasal congestion: occasional  [-] Dry mouth:  [-] Mouth breathing:   [-] Pain/skin breakdown:      Improvements noted with CPAP: She does not notice a difference.      Weight change since sleep study:  No change in  the last year    Bedtime: 8:30-9 PM.  Wake time: 4-6 AM. Wakes 1-2 times per night. She takes zolpidem about every third night.      Past medical/surgical history, family history, social history, medications and allergies were reviewed.      Problem List:  Patient Active Problem List    Diagnosis Date Noted     Obstructive sleep apnea 08/04/2015     Priority: Medium     Problem list name updated by automated process. Provider to review       Hamstring muscle strain 04/16/2010     Priority: Medium        Impression/Plan:    (G47.33) Obstructive sleep apnea  (primary encounter diagnosis)  Comment: Jonna uses CPAP although feels no benefit. Her apnea was profoundly positional. She sometimes gets fed up with the mask and will remove it, or will have congestion preventing use on an occasional night. Overall, she is meeting compliance and her download looks very good. If she does not use the CPAP, she uses a tennis ball t-shirt to help prevent supine sleep. She asks about a replacement CPAP today because her machine has been recalled. She has tried to register with [employer] but has struggled to get the website working properly or get through to anyone on the phone. She is encouraged by her daughter to come live with her in [place] at [place]000 ft for 3-4 months of the year. Jonna asks if that would be a healthy choice.   Plan: Comprehensive DME        An order was placed for a replacement auto CPAP 5-15 cm, due to the recall. New supplies were also ordered. I will have one of our sleep therapy management staff call her to help her get her machine registered. I looked up some research on elderly living at altitude and called her back to review the findings. Most articles suggest elderly folks acclimate no differently and may experience fewer symptoms of acute mountain sickness. In the long term, mortality due to heart disease, stroke and cancer may be reduced, but folks with COPD or other lung disease may have lower  longevity. She is healthy and I see no contraindications for her staying there for lengthy periods. Her download over the week that she was there in July did not show evidence of periodic breathing, so her apnea should not be a contraindication. I suggested she also get the opinion of her primary.     She will follow up with me in about 1 year(s).     40 minutes were spent on the date of the encounter doing chart review, history and exam, documentation and further activities as noted above.     Bennett Goltz, PA-C    CC: Isai Castro MD

## 2021-09-02 NOTE — PROGRESS NOTES
Jonna is a 81 year old who is being evaluated via a billable video visit.      How would you like to obtain your AVS? Mail a copy  If the video visit is dropped, the invitation should be resent by: Send to e-mail at: [email or web address]  Will anyone else be joining your video visit? No      Video Start Time: 3:18 PM  Video-Visit Details    Type of service:  Video Visit    Video End Time:3:51 PM    Originating Location (pt. Location): Home    Distant Location (provider location):  [facility]     Platform used for Video Visit: World[employer]

## 2021-09-02 NOTE — PATIENT INSTRUCTIONS
Your sleep apnea treatment may be affected by device recall    Our records show that you may have a James Respironics CPAP for the treatment of sleep apnea. Many of these devices have been recalled* by the  for replacement. [facility] recommends:     1) If you are using a [employer] device, continue using the device.  2) If you have a [employer] Respironics device, register your device for confirmation of type of device and repair of the device at    -if you cannot use link, call [phone].  The website will assist you in obtaining the serial number for registration.   3) If you are using a James Respironics CPAP or Bilevel PAP device and you do not have immediate breathing, driving or cardiovascular risks without the device, consider stopping use of the device after verification that is has been recalled. Discuss this decision with your medical provider if you are uncertain about your medical risks.  4) If you are not using Respironics CPAP but are using a Respironics advanced device for breathing support (AVAPS, ASV, Bilevel PAP), continue using the device and review 5 and 6 below).     5) If you continue the device, do not include ozone generating  connected to PAP devices.  6) f you continue the device, you may choose to use a bacterial filter to reduce particulates from the device although on CPAP devices, pressures may need to be increased with the filter in place. These filters are not as effective as repair of the device.     ?       You may also choose discuss with your provider alternative approaches to treatment.      *[employer] is voluntarily recalling the below devices due to two (2) issues related to the polyester-based polyurethane (PE-PUR) sound abatement foam used in James Continuous and Non-Continuous Ventilators: 1) PE-PUR foam may degrade into particles which may enter the device's the air pathway and be ingested or inhaled by the user, and 2) the  PE-PUR foam may off-gas certain chemicals. The foam degradation may be exacerbated by use of unapproved cleaning methods, such as ozone (see FDA safety communication on use of Ozone ), and off-gassing may occur during initial operation and may possibly continue throughout the device's useful life.   These issues can result in serious injury which can be life-threatening, cause permanent impairment, and/or require medical intervention to preclude permanent impairment. To date, [employer] has received several complaints regarding the presence of black debris/particles within the airpath circuit (extending from the device outlet, humidifier, tubing, and mask). [facility] also has received reports of headache, upper airway irritation, cough, chest pressure and sinus infection. The potential risks of particulate exposure include: Irritation (skin, eye, and respiratory tract), inflammatory response, headache, asthma, adverse effects to other organs (e.g. kidneys and liver) and toxic carcinogenic affects. The potential risks of chemical exposure due to off-gassing include: headache/dizziness, irritation (eyes, nose, respiratory tract, skin), hypersensitivity, nausea/vomiting, toxic and carcinogenic effects. There have been no reports of death as a result of these issues.    Actions to be taken:  Discontinue the use of your device.  Do not continue to use ozone  with the device.     [place] affected devices on the recall website, www.[employer]/SRC-update    i. The website provides current information on the status of the recall and how  to receive permanent corrective action to address the two issues.    ii. The website also provides instructions on how to locate an affected device  Serial Number and will guide users through the registration process.    iii. In the , call [phone] Service Hotline if you cannot visit the website

## 2021-09-02 NOTE — Clinical Note
STM-  Can someone please contact her and help her get her CPAP registered for replacement. She has tried going online and calling and has really struggled.  Thanks,  Conor

## 2021-09-03 ENCOUNTER — DOCUMENTATION ONLY (OUTPATIENT)
Dept: SLEEP MEDICINE | Facility: CLINIC | Age: 81
End: 2021-09-03
Payer: COMMERCIAL

## 2021-09-03 NOTE — NURSING NOTE
Return in about 1 year reminder sent via [employer].       IRENE Mojica [facility]

## 2021-09-04 NOTE — PROGRESS NOTES
STM  Recall:    Patient call regarding James [employer] recall for their pap device.    Device type:: Auto CPAP    Supplemental oxygen: No , if yes moved to advanced device workflow.     Current durable medical equipment provider: [facility]     Age of your current device:  less than 5 years old    History review:     Does the patient have the following?      COPD No     Hypoventilation No    Pulmonary hypertension No    Neuromuscular disease related respiratory problems No    History of past or present cardiac arrhythmia  No    History of heart failure  No    Recent hospitalization for breathing problems No       Other concerns:    DOT license requiring treatment of obstructive sleep apnea occupation that requires operation of hazardous equipment  No    Extreme sleepiness or drowsy driving prior to using CPAP or BiPAP treatment? No       Discontinuation of PAP therapy would lead to substantial deterioration of functional status or quality of life. Yes    If yes to any of the questions      Advised patient to continue using therapy until device is replaced or repaired.    Advised patient to avoid unapproved cleaning methods, such as ozone (see FDA safety communication on use of ozone ).    Has patient registered device? Yes Advised patient to register for repair or replacement on the James website.  Patient can call James at [phone] for additional support.    Consider use of bacterial filter with reassessment of pressure needs. You may need to get a new tube if your current tube is heated.  Sources for filters:  online sources or your DME. If you feel your symptoms are returning or you feel your pressures are insufficient , please change the filter and if issues continue reach out to your provider. Consider discontinuing using humidity with the filter.     Does the patient feel they still need to have further discussion with provider ?   No     Please contact your DME to see if you are  eligible to receive a new device if it is over 5 years old.  You may also choose to pay out of pocket for a new device, if your insurance does not cover a new device at this time.      Patient has current complaints of yes concerns of her CPAP recommending patient visit with primary care provider regarding symptoms.  If primary care provider is not able to determine cause of complaint patient instructed to discontinue use of device.     If no to all questions:     Advised patient to discontinue use of the device.     Has patient registered device? Yes Advised patient to register for repair or replacement on the [employer] website.  Patient can call [employer] at [phone] for additional support.    Get another device that is not impacted by recall if possible. Please contact your DME to see if you are eligible to receive a new device if it is over 5 years old.     Discuss alternative treatments, including positional therapy, oral appliance therapy, and surgery.       Discussed behavioral strategies such as weight loss, exercise, and avoidance of alcohol and sedatives before bedtime.    Does the patient have additional questions or concerns to be sent to the provider at this time?  No    Plan :     Patient would like to schedule a follow up visit with sleep provider to discuss options/concerns.

## 2021-09-07 ENCOUNTER — TELEPHONE (OUTPATIENT)
Dept: SLEEP MEDICINE | Facility: CLINIC | Age: 81
End: 2021-09-07

## 2021-09-07 NOTE — TELEPHONE ENCOUNTER
Received Rx for a replacement CPAP from [facility] [place]. Per sleep follow up notes from 9/2/21 patient is having trouble with registering her current Dreamstation machine for the recall. Left patient a voice mail letting her know that she will be eligible for a replacement 11/13/23 and to call [employer] at [phone] for assistance with registering her current machine or call [facility] if she has questions.

## 2021-09-08 ENCOUNTER — TELEPHONE (OUTPATIENT)
Dept: SLEEP MEDICINE | Facility: CLINIC | Age: 81
End: 2021-09-08

## 2021-09-08 NOTE — TELEPHONE ENCOUNTER
Voicemail left with patient. She states that [facility]  Patient reports that [facility] said  We (the clinic) need to help her get a new device.  I have included notes below from [facility]

## 2021-09-08 NOTE — TELEPHONE ENCOUNTER
Spoke with patient.  She was advised by her insurance that she needs to return her device to [facility]ending [facility] to [facility] and get a rental.      Advised patient that rentals may not be available at time time and she could check with her DME, however it is unlikely that they have units available at this time.      She has registered her device with [employer].  She states that the provider states that she must use the device.

## 2021-09-08 NOTE — TELEPHONE ENCOUNTER
Reason for Call:  Other call back    Detailed comments: patient is calling to discuss how to get a replacement machine, due to the recall.     Phone Number Patient can be reached at: Home number on file [phone] (home)    Best Time: any    Can we leave a detailed message on this number? YES    Call taken on 9/8/2021 at 10:20 AM by Delmy Harris

## 2021-10-23 ENCOUNTER — HEALTH MAINTENANCE LETTER (OUTPATIENT)
Age: 81
End: 2021-10-23

## 2022-02-12 ENCOUNTER — HEALTH MAINTENANCE LETTER (OUTPATIENT)
Age: 82
End: 2022-02-12

## 2022-07-19 ENCOUNTER — DOCUMENTATION ONLY (OUTPATIENT)
Dept: SLEEP MEDICINE | Facility: CLINIC | Age: 82
End: 2022-07-19

## 2022-07-19 NOTE — PROGRESS NOTES
719/2022- BARBI JUAREZ IS GOING TO CALL [employer] [phone] TO REQUEST LITERATURE RE: DS 2 MACHINE. SHE STATES SHE NEVER RECD ANY WE DS2 WAS SENT TO HER IN MAIL

## 2022-08-02 ENCOUNTER — VIRTUAL VISIT (OUTPATIENT)
Dept: SLEEP MEDICINE | Facility: CLINIC | Age: 82
End: 2022-08-02
Payer: COMMERCIAL

## 2022-08-02 VITALS — WEIGHT: 135 LBS | BODY MASS INDEX: 20.46 KG/M2 | HEIGHT: 68 IN

## 2022-08-02 DIAGNOSIS — G47.33 OBSTRUCTIVE SLEEP APNEA: Primary | ICD-10-CM

## 2022-08-02 PROCEDURE — 99215 OFFICE O/P EST HI 40 MIN: CPT | Mod: 95 | Performed by: PHYSICIAN ASSISTANT

## 2022-08-02 ASSESSMENT — SLEEP AND FATIGUE QUESTIONNAIRES
HOW LIKELY ARE YOU TO NOD OFF OR FALL ASLEEP WHILE LYING DOWN TO REST IN THE AFTERNOON WHEN CIRCUMSTANCES PERMIT: SLIGHT CHANCE OF DOZING
HOW LIKELY ARE YOU TO NOD OFF OR FALL ASLEEP WHILE SITTING QUIETLY AFTER LUNCH WITHOUT ALCOHOL: WOULD NEVER DOZE
HOW LIKELY ARE YOU TO NOD OFF OR FALL ASLEEP WHEN YOU ARE A PASSENGER IN A CAR FOR AN HOUR WITHOUT A BREAK: WOULD NEVER DOZE
HOW LIKELY ARE YOU TO NOD OFF OR FALL ASLEEP WHILE SITTING AND TALKING TO SOMEONE: WOULD NEVER DOZE
HOW LIKELY ARE YOU TO NOD OFF OR FALL ASLEEP WHILE SITTING AND READING: WOULD NEVER DOZE
HOW LIKELY ARE YOU TO NOD OFF OR FALL ASLEEP WHILE WATCHING TV: MODERATE CHANCE OF DOZING
HOW LIKELY ARE YOU TO NOD OFF OR FALL ASLEEP WHILE SITTING INACTIVE IN A PUBLIC PLACE: WOULD NEVER DOZE
HOW LIKELY ARE YOU TO NOD OFF OR FALL ASLEEP IN A CAR, WHILE STOPPED FOR A FEW MINUTES IN TRAFFIC: WOULD NEVER DOZE

## 2022-08-02 NOTE — PROGRESS NOTES
CPAP Follow-Up Visit:    Date on this visit: 8/2/2022    Jonna Bloom has a follow-up of her treatment for ANGELA, RLS and insomnia.        PSG  ANGELA: 2004 study at [place] with AHI of 17.6/hr. Her AHI was 57.7/hr on her back and 0.6/hr off her back.     She had tried positional restriction and a dental appliance but developed jaw pain.      She got a new machine from [employer].  She got a travel CPAP and finds it cold and dry and unusable.   She often travels with her daughter and she says she snores very loudly but     PAP machine: [employer] (obtained 11/13/2018). Pressure settings: 5-15 cm    The compliance data shows that the patient used the CPAP for 92% of nights for >4 hours.  The 90th% pressure is 7 cm.  The average time in large leak is 0.  The average nightly usage is 6 hrs.  The average AHI is 1.9/hr.          Interface:  Mask: Nasal Dreamwear mask. Has tried N20.  Chin strap: No  Leak: No  Using Humidifier: Yes  Condensation in hose or mask: No     Difficulties with therapy:    [-] Snoring with CPAP: not observed unless travelling.  [-] Difficulty tolerating the pressure:  [-] Epistaxis/dry nose:   [-] Nasal congestion:  [-] Dry mouth:  [-] Mouth breathing:   [-] Pain/skin breakdown:      Improvements noted with CPAP:  She does not know if she sleeps better because she has been using CPAP so long she forgets what it would be like without it.    Weight change since sleep study: 135 lbs    Bedtime: 8:30-9 PM. She takes 2.5 mg zolpidem for RLS. If she does not take it, she will lay there and kick her legs for an hour. She had tried gabapentin and may have had dizziness. She has tried mirtazapine, hydroxyzine.  Wake time: 6-6:30 AM. Falls asleep in 5 minutes. Wakes 1 times per night for 5 minutes. Reason for waking: restroom. She often does put the CPAP back on when getting back to bed. She is not sure if she stays on her side.   Naps: 1 times per week for 60 minutes. She will stay up later then.      Her  ferritin was 89 in 2018    She asks about readings on her wearable device showing her heart rate goes below 40 bpm on a couple of occasions. She denies orthopnea.    Past medical/surgical history, family history, social history, medications and allergies were reviewed.      Problem List:  Patient Active Problem List    Diagnosis Date Noted     Obstructive sleep apnea 08/04/2015     Priority: Medium     Problem list name updated by automated process. Provider to review       Hamstring muscle strain 04/16/2010     Priority: Medium        Impression/Plan:    (G47.33) Obstructive sleep apnea  (primary encounter diagnosis)  Comment: Jonna is doing ok with her home CPAP. She got a replacement from [employer] and wanted to make sure we have remote access. She asks about travel options. She has tried [employer] but it was too big. She has a travel CPAP but is confused by all of the parts and also feels the air is too cold and dry. She is frustrated by all of the money she has spent on things that don't work for her. Otherwise, her download indicates well controlled apnea. She had primarily positional apnea. She is not sure how much she ends up on her back.  Plan: Continue auto CPAP 5-15 cm. A prescription was written for new supplies.  We talked about positional restriction devices. We talked about use of the travel machine. She was advised to try a hose wrap to help keep the air warmer.  She was advised to schedule a follow up in person and bring all of her gear so we can review how everything works together.       She will follow up with me at my next available in clinic.     40 minutes were spent on the date of the encounter doing chart review, history and exam, documentation and further activities as noted above.     Bennett Goltz, PA-C    CC: No ref. provider found

## 2022-08-02 NOTE — PROGRESS NOTES
Jonna is a 81 year old who is being evaluated via a billable video visit.      How would you like to obtain your AVS? MyChart  If the video visit is dropped, the invitation should be resent by: Send to e-mail at: [email or web address]  Will anyone else be joining your video visit? No        Video-Visit Details    Video Start Time: 2:03 PM    Type of service:  Video Visit    Video End Time:2:40 PM    Originating Location (pt. Location): Home    Distant Location (provider location):  [facility]     Platform used for Video Visit: Stephen Chand

## 2022-08-09 NOTE — NURSING NOTE
Patient was already scheduled for an in person follow up with Bennett Goltz, PA-C in November. I left a detailed message for patient asking if she's willing to go to [place] [facility] [place]. There's an in-person appointment open with Conor in September 15th.     Upon call back please reschedule patient if she is interested.

## 2022-10-09 ENCOUNTER — HEALTH MAINTENANCE LETTER (OUTPATIENT)
Age: 82
End: 2022-10-09

## 2022-11-01 NOTE — PROGRESS NOTES
CPAP Follow-Up Visit:    Date on this visit: 11/2/2022    Jonna Bloom has a follow-up of her treatment for ANGELA, RLS and insomnia.        PSG  ANGELA: 2004 study at [place] with AHI of 17.6/hr. Her AHI was 57.7/hr on her back and 0.6/hr off her back.     She had tried positional restriction and a dental appliance but developed jaw pain.     She brought her regular CPAP, travel CPAP and other devices she has tried. She struggles with her travel CPAP due to the air being cold and dry. She has 3 different masks, a full face, Dreamwear nasal and N20. It appears she has only used the Dreamwear mask.     She does sometimes sleep supine when on CPAP. She is not sure if she sleeps better with CPAP.  When travelling with the Sleep Noodle and not CPAP, she felt well-rested and felt she slept great.     PAP machine: [employer]. Pressure settings: 5-15 cm    The compliance data shows that the patient used the CPAP for 23/32 nights, 68.8% of nights for >4 hours.  The 90th% pressure is 7.5 cm.  The average time in large leak is 47 sec.  The average nightly usage is 8:16.  The average AHI is 1.7/hr.  She used the Sleep Noodle a couple of times when travelling. She was told she did not snore on a couple of nights and one night her daughter said she was snoring horribly. She would like to know how that works as it is much more convenient for travel.          Interface:  Mask: Nasal Dreamwear mask. Has tried N20.  Chin strap: No  Leak: Yes/No  Using Humidifier: Yes  Condensation in hose or mask: Yes/No       Weight change since sleep study: 132 lbs. Her weight has been stable for several years.        Past medical/surgical history, family history, social history, medications and allergies were reviewed.      Problem List:  Patient Active Problem List    Diagnosis Date Noted     Obstructive sleep apnea 08/04/2015     Priority: Medium     Problem list name updated by automated process. Provider to review       Hamstring muscle strain  04/16/2010     Priority: Medium        Impression/Plan:    (G47.33) Obstructive sleep apnea  (primary encounter diagnosis)  Comment: Jonna presents today primarily to have someone help her with all of her CPAP equipment.  She has a travel machine which bothers her due to lack of humidity and heating.  She is confused by which mask and hose we will work with which machine.  She has a sleep noodle positional restriction device which she had traveled with.  Most nights no snoring was reported, but 1 night she was observed to snore loudly.  She will be traveling to [place] in a couple of months and would like to make sure that she is safe to use only the positional restriction device to treat her apnea.  She has a very high degree of anxiety about leaving her apnea untreated and all of the money she has spent on different CPAP equipment.  Last study was 2004, it did not show significant apnea when lateral.  Plan: Comprehensive Sleep Study, Overnight oximetry study, Comprehensive Sleep Study       Given that our sleep studies are booking out past when she would leave for her trip, I have ordered oximetry with positional restriction device.  We will conduct an in lab split study when she returns to get a thorough evaluation of her apnea with positional restriction.  On the test I will have the first half with positional restriction appliance, and titrate CPAP in the second half if her AHI remains over 10/hr when lateral.  She was encouraged to try a hose wrap with her travel machine to prevent the air from being as cold.  Most of the visit was spent showing her how to connect her different machines hoses and masks.     She will follow up with me in about 2 week(s).     40 minutes were spent on the date of the encounter doing chart review, history and exam, documentation and further activities as noted above.     Bennett Goltz, PA-C    CC: No ref. provider found

## 2022-11-02 ENCOUNTER — OFFICE VISIT (OUTPATIENT)
Dept: SLEEP MEDICINE | Facility: CLINIC | Age: 82
End: 2022-11-02
Payer: COMMERCIAL

## 2022-11-02 VITALS
HEART RATE: 60 BPM | WEIGHT: 132 LBS | BODY MASS INDEX: 20 KG/M2 | OXYGEN SATURATION: 97 % | SYSTOLIC BLOOD PRESSURE: 101 MMHG | HEIGHT: 68 IN | DIASTOLIC BLOOD PRESSURE: 63 MMHG

## 2022-11-02 DIAGNOSIS — G47.33 OBSTRUCTIVE SLEEP APNEA: Primary | ICD-10-CM

## 2022-11-02 PROCEDURE — 99215 OFFICE O/P EST HI 40 MIN: CPT | Performed by: PHYSICIAN ASSISTANT

## 2022-11-02 NOTE — NURSING NOTE
"Chief Complaint   Patient presents with     RECHECK       Initial /63   Pulse 60   Ht 1.727 m (5' 7.99\")   Wt 59.9 kg (132 lb)   SpO2 97%   BMI 20.08 kg/m   Estimated body mass index is 20.08 kg/m  as calculated from the following:    Height as of this encounter: 1.727 m (5' 7.99\").    Weight as of this encounter: 59.9 kg (132 lb).    Medication Reconciliation: complete          "

## 2022-11-21 ENCOUNTER — OFFICE VISIT (OUTPATIENT)
Dept: SLEEP MEDICINE | Facility: CLINIC | Age: 82
End: 2022-11-21
Payer: COMMERCIAL

## 2022-11-21 DIAGNOSIS — G47.33 OBSTRUCTIVE SLEEP APNEA: ICD-10-CM

## 2022-11-28 ENCOUNTER — TELEPHONE (OUTPATIENT)
Dept: SLEEP MEDICINE | Facility: CLINIC | Age: 82
End: 2022-11-28

## 2022-11-30 ENCOUNTER — TELEPHONE (OUTPATIENT)
Dept: SLEEP MEDICINE | Facility: CLINIC | Age: 82
End: 2022-11-30

## 2022-11-30 NOTE — TELEPHONE ENCOUNTER
Oximetry results were obtained for the date of 11/23/22.  The patient was using a positional restriction device.  The study showed a valid recording time of 9:07 with a 4% desaturation index of 3.5/hr.  The mean oxygen saturation was 94.4% and the lowest SpO2 was 78%.  The patient spent 4.2 minutes below 89% SpO2.   There was a portion of the study that appears artifactual from about 1-2 AM.    I called Jonna and relayed he results. This shows that on average, positional restriction seems to largely normalize her apnea. She did have a couple of small clusters of desaturation events, but the average ERIN was in the normal range at 3.5/hr. She was told she should be safe to travel with the positional restriction device and can even treat that way at home as long as she does not notice worsening sleep quality or daytime energy with positional restriction compared to with CPAP.  Bennett Goltz, PA-C

## 2022-11-30 NOTE — TELEPHONE ENCOUNTER
Reason for Call:  Other call back    Detailed comments: Patient would really like the results for the oximetry test as soon as possible - in an after visit summary it states that she spoke with Dr. Goltz  on 11/28/22 when she says she did not    Phone Number Patient can be reached at: Home number on file [phone] (home)    Best Time: anytime    Can we leave a detailed message on this number? YES    Call taken on 11/30/2022 at 1:14 PM by Sanaz Cardenas        We reviewed oximetry results on 11/30. This current message asking for a call back was entered before I spoke with her on the 30th (but I entered the details of our conversation on the encounter from the 28th as I was following up on the call from that day). She did not have any further questions.  Bennett Goltz, PA-C

## 2022-12-20 ENCOUNTER — TELEPHONE (OUTPATIENT)
Dept: URGENT CARE | Facility: CLINIC | Age: 82
End: 2022-12-20

## 2023-02-18 ENCOUNTER — HEALTH MAINTENANCE LETTER (OUTPATIENT)
Age: 83
End: 2023-02-18

## 2023-03-09 ENCOUNTER — HOSPITAL ENCOUNTER (EMERGENCY)
Facility: CLINIC | Age: 83
Discharge: HOME | End: 2023-03-09
Attending: EMERGENCY MEDICINE | Admitting: EMERGENCY MEDICINE
Payer: COMMERCIAL

## 2023-03-09 VITALS
TEMPERATURE: 97.9 F | HEART RATE: 63 BPM | HEIGHT: 67 IN | WEIGHT: 126.7 LBS | OXYGEN SATURATION: 98 % | SYSTOLIC BLOOD PRESSURE: 162 MMHG | RESPIRATION RATE: 18 BRPM | DIASTOLIC BLOOD PRESSURE: 93 MMHG | BODY MASS INDEX: 19.89 KG/M2

## 2023-03-09 DIAGNOSIS — F41.9 ANXIETY: ICD-10-CM

## 2023-03-09 DIAGNOSIS — F33.9 MAJOR DEPRESSION, RECURRENT (H): ICD-10-CM

## 2023-03-09 DIAGNOSIS — R45.851 SUICIDAL IDEATION: ICD-10-CM

## 2023-03-09 DIAGNOSIS — F43.10 PTSD (POST-TRAUMATIC STRESS DISORDER): ICD-10-CM

## 2023-03-09 LAB — SARS-COV-2 RNA RESP QL NAA+PROBE: NEGATIVE

## 2023-03-09 PROCEDURE — C9803 HOPD COVID-19 SPEC COLLECT: HCPCS

## 2023-03-09 PROCEDURE — 90791 PSYCH DIAGNOSTIC EVALUATION: CPT

## 2023-03-09 PROCEDURE — U0003 INFECTIOUS AGENT DETECTION BY NUCLEIC ACID (DNA OR RNA); SEVERE ACUTE RESPIRATORY SYNDROME CORONAVIRUS 2 (SARS-COV-2) (CORONAVIRUS DISEASE [COVID-19]), AMPLIFIED PROBE TECHNIQUE, MAKING USE OF HIGH THROUGHPUT TECHNOLOGIES AS DESCRIBED BY CMS-2020-01-R: HCPCS | Performed by: EMERGENCY MEDICINE

## 2023-03-09 PROCEDURE — 99283 EMERGENCY DEPT VISIT LOW MDM: CPT | Performed by: NURSE PRACTITIONER

## 2023-03-09 PROCEDURE — 99285 EMERGENCY DEPT VISIT HI MDM: CPT | Mod: 25

## 2023-03-09 RX ORDER — ESCITALOPRAM OXALATE 5 MG/1
TABLET ORAL
Qty: 30 TABLET | Refills: 0 | Status: SHIPPED | OUTPATIENT
Start: 2023-03-09 | End: 2023-04-08

## 2023-03-09 ASSESSMENT — COLUMBIA-SUICIDE SEVERITY RATING SCALE - C-SSRS
4. HAVE YOU HAD THESE THOUGHTS AND HAD SOME INTENTION OF ACTING ON THEM?: NO
TOTAL  NUMBER OF INTERRUPTED ATTEMPTS LIFETIME: NO
1. IN THE PAST MONTH, HAVE YOU WISHED YOU WERE DEAD OR WISHED YOU COULD GO TO SLEEP AND NOT WAKE UP?: YES
REASONS FOR IDEATION PAST MONTH: COMPLETELY TO END OR STOP THE PAIN (YOU COULDN'T GO ON LIVING WITH THE PAIN OR HOW YOU WERE FEELING)
2. HAVE YOU ACTUALLY HAD ANY THOUGHTS OF KILLING YOURSELF?: YES
1. HAVE YOU WISHED YOU WERE DEAD OR WISHED YOU COULD GO TO SLEEP AND NOT WAKE UP?: YES
ATTEMPT LIFETIME: NO
6. HAVE YOU EVER DONE ANYTHING, STARTED TO DO ANYTHING, OR PREPARED TO DO ANYTHING TO END YOUR LIFE?: NO
3. HAVE YOU BEEN THINKING ABOUT HOW YOU MIGHT KILL YOURSELF?: NO
TOTAL  NUMBER OF ABORTED OR SELF INTERRUPTED ATTEMPTS LIFETIME: NO
REASONS FOR IDEATION LIFETIME: COMPLETELY TO END OR STOP THE PAIN (YOU COULDN'T GO ON LIVING WITH THE PAIN OR HOW YOU WERE FEELING)
5. HAVE YOU STARTED TO WORK OUT OR WORKED OUT THE DETAILS OF HOW TO KILL YOURSELF? DO YOU INTEND TO CARRY OUT THIS PLAN?: NO
2. HAVE YOU ACTUALLY HAD ANY THOUGHTS OF KILLING YOURSELF?: YES
4. HAVE YOU HAD THESE THOUGHTS AND HAD SOME INTENTION OF ACTING ON THEM?: NO
5. HAVE YOU STARTED TO WORK OUT OR WORKED OUT THE DETAILS OF HOW TO KILL YOURSELF? DO YOU INTEND TO CARRY OUT THIS PLAN?: NO

## 2023-03-09 ASSESSMENT — ENCOUNTER SYMPTOMS
DYSPHORIC MOOD: 1
APPETITE CHANGE: 1
ABDOMINAL PAIN: 0
DIARRHEA: 0
NERVOUS/ANXIOUS: 1

## 2023-03-09 ASSESSMENT — ACTIVITIES OF DAILY LIVING (ADL)
ADLS_ACUITY_SCORE: 33
ADLS_ACUITY_SCORE: 35

## 2023-03-09 NOTE — ED NOTES
Patient's daughter in law and granddaughter may be contacted for collateral information:     [place] [phone]  [place] [phone]

## 2023-03-09 NOTE — ED TRIAGE NOTES
Pt presents to ED after being sent be her PCP for worsening depression and anxiety.  Pt reports suicidal thoughts with no plan, intention, or history of self harm.     Triage Assessment     Row Name 03/09/23 4455       Triage Assessment (Adult)    Airway WDL WDL       Respiratory WDL    Respiratory WDL WDL       Skin Circulation/Temperature WDL    Skin Circulation/Temperature WDL WDL       Cardiac WDL    Cardiac WDL WDL       Peripheral/Neurovascular WDL    Peripheral Neurovascular WDL WDL       Cognitive/Neuro/Behavioral WDL    Cognitive/Neuro/Behavioral WDL WDL

## 2023-03-09 NOTE — ED PROVIDER NOTES
History     Chief Complaint:  Depression and Anxiety     The history is provided by the patient.      Jonna Bloom is a 82 year old female with a history of anxiety and depression who presents to the ED after being sent here by her primary care provider for worsening anxiety and depression. The patient reports that she has been feeling increasingly anxious and depressed for the last month. She has also been having suicidal ideations but states that she has no plan to commit suicide. The patient does live alone, in a house, and has been able to care for herself but notes sleeping much more than usual and having a decreased appetite. She has had no abdominal pain or diarrhea and denies any history of diabetes mellitus or hypertension.    Independent Historian:   Daughter in law     Review of External Notes: I reviewed her clinic visit from today.    ROS:  Review of Systems   Constitutional: Positive for appetite change.   Gastrointestinal: Negative for abdominal pain and diarrhea.   Psychiatric/Behavioral: Positive for dysphoric mood and suicidal ideas. The patient is nervous/anxious.    All other systems reviewed and are negative.    Allergies:  Sulfa Drugs  Gluten Meal  Penicillins     Medications:    Prolia  Ativan  Crestor  Ambien  Fibercon  Arnuity Ellipta  Proscar  Prilosec  Flonase    Past Medical History:    Anxiety  Chronic rhinitis  Endometriosis  GERD  Insomnia  Sleep apnea   Hyperlipidemia  Paroxysmal supraventricular tachycardia   Benign neoplasm of colon  BCC  Celiac disease  IBS  Osteoporosis    Depression   Hypothyroidism   RLS  PTSD    Past Surgical History:    Finger arthroplasty, right  Breast augmentation  Cholecystectomy  Ovarian resection, tubal ligation  Orthopedia surgery, hand  Excision of basal cell  Bilateral hip replacement  Tonsillectomy   Left wrist surgery   Joint replacement   D&C    Family History:    Stroke   Colon cancer  Suicide   Alcohol abuse  Heart disease  Type 2 diabetes  mellitus   Emphysema     Social History:  The patient presents to the ED with her daughter in law.  The patient arrived to the ED in a private vehicle.  PCP: Isai Castro     Physical Exam     Patient Vitals for the past 24 hrs:   BP Temp Temp src Pulse Resp SpO2   03/09/23 1255 (!) 185/89 97.5  F (36.4  C) Temporal 59 18 99 %      Physical Exam  Nursing note and vitals reviewed.  Constitutional:  Appears comfortable.   HENT:    Mucus membranes are moist.  Eyes:    Conjunctivae are normal. No jaundice.  Pupils equal  Cardiovascular:  Normal rate, regular rhythm.      Normal heart sounds.     No murmur heard.  Pulmonary/Chest:  Breath sounds clear. No respiratory distress.     No stridor.   Neurological:   Alert and appropriate. No focal weakness.  Gait is normal.  Skin:    Skin is warm and dry. No rash noted. No diaphoresis.   Psychiatric:   Patient makes poor eye contact, admits to having some suicidal ideation, feels depressed and somewhat anxious.  No evidence of delusions or psychosis at this time.  Flat affect and depressed mood.    Emergency Department Course     Laboratory:  Labs Ordered and Resulted from Time of ED Arrival to Time of ED Departure   COVID-19 VIRUS (CORONAVIRUS) BY PCR - Normal       Result Value    SARS CoV2 PCR Negative        Emergency Department Course & Assessments:    PSS-3    Date and Time Over the past 2 weeks have you felt down, depressed, or hopeless? Over the past 2 weeks have you had thoughts of killing yourself? Have you ever attempted to kill yourself? When did this last happen? User   03[place]09/23 1257 yes yes no [place] [facility] [place]    Date and Time Q1 Wished to be Dead (Past Month) Q2 Suicidal Thoughts (Past Month) Q3 Suicidal Thought Method Q4 Suicidal Intent without Specific Plan Q5 Suicide Intent with Specific Plan Q6 Suicide Behavior (Lifetime) Within the Past 3 Months? RETIRED: Level of Risk per Screen Screening Not Complete User   03/09/23 1257 yes yes no no  no no -- -- -- [facility] [place]              Suicide assessment completed by mental health (D.E.C., LCSW, etc.)    Assessments:  1307: I performed an exam of the patient and obtained history, as documented above. I believe she should be transferred to [facility].    Independent Interpretation (X-rays, CTs, rhythm strip):  None    Consultations/Discussion of Management or Tests:  None     Social Determinants of Health affecting care:   None    Disposition:  The patient was transferred to [facility].     Impression & Plan      Medical Decision Making:  Patient comes in with some significant depression and anxiety that is gotten worse.  She lives alone.  Denies a plan but her daughter and the patient both agree that she has had a few thoughts of harming herself.  No attempts in the past.  I feel that she needs further evaluation by psychiatry.  She is not sick at this time and her COVID is negative.  She has a very healthy history with minimal medical history other than hyperlipidemia.  She is medically cleared at this time to go to [facility] when a bed opens.    Diagnosis:    ICD-10-CM    1. Depression, unspecified depression type  F32.A       2. Anxiety  F41.9          Scribe Disclosure:  I, Maren Gilliamallhayde, am serving as a scribe at 3:11 PM on 3/9/2023 to document services personally performed by Judith Rader MD based on my observations and the provider's statements to me.     3/9/2023   Judith Rader MD Powell, Tracy Alan, MD  03/09/23 5016

## 2023-03-10 ENCOUNTER — TELEPHONE (OUTPATIENT)
Dept: BEHAVIORAL HEALTH | Facility: CLINIC | Age: 83
End: 2023-03-10
Payer: COMMERCIAL
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
## 2023-03-10 NOTE — DISCHARGE INSTRUCTIONS
Follow up with your social work Monday appointment as scheduled.    Here is a current psychiatry in person appointment to follow up for on going medication management:  4/10/2023   1:20 pm Monday   Daysi Tinoco CNP,PMFRANKLYNP,RN   [facility] - [address], [address], [place]  [facility]    [facility] will reach out tomorrow to assist in scheduling therapy providers that are accessible to you in person, please look for their phone call.    Aftercare Plan    Follow up with established providers and supports as scheduled. Continue taking medications as prescribed. Abstain from drugs and alcohol. Utilize your [facility] hospital mental health crisis team as needed. They are available 24/7. Contact information is listed below.     If I am feeling unsafe or I am in a crisis, I will:   Contact my established care providers   Call the National Suicide Prevention Lifeline: 594.300.9942   Go to the nearest emergency room   Call 911     Warning signs that I or other people might notice when a crisis is developing for me: changes to sleep, appetite or mood, increased anger, agitation or irritability, feeling depressed or hopeless, spending more time alone or talking less, increased crying, decreased productivity, seeing or hearing things that aren't there, thoughts of not wanting to live anymore or of actually killing myself, thoughts of hurting others    Things I am able to do on my own to cope or help me feel better: watching a favorite tv show or movie, listening to music I enjoy, going outside and breathing fresh air, going for a walk or exercising, taking a shower or bath, a cold or hot beverage, a healthy snack, drawing/coloring/painting, journaling, singing or dancing, deep breathing     I can try practicing square breathing when I begin to feel anxious - inhale through the nose for the count of 4 and the first line on the square. Exhale through the mouth for the count of 4  for the second line of the square. Repeat to complete the square. Repeat the square as many times as needed.    I can also use my five senses to practice mindfulness and grounding. What are five things I can see, four things I can hear, three things I can feel, two things I can smell, and one thing I can taste.     Things that I am able to do with others to cope or help me feel better: sometimes just talking or spending time with someone else, sharing a meal or having coffee, watching a movie or playing a game, going for a walk or exercising    I can also use community resources including mental health hotlines, [facility] crisis teams, or apps.     Things I can use or do for distraction: movies/tv, music, reading, games, drawing/coloring/painting or other art, essential oils, exercise, cleaning/organizing, puzzles, crossword puzzles, word search, Sudoku       I can also download a meditation or relaxation camilo, like Calm, Headspace, or Insight Timer (all three offer a free version)    A great website resource is Change to Chill with active coping skills    Changes I can make to support my mental health and wellness: Attend scheduled mental health therapy and psychiatric appointments. Take my medications as prescribed. Maintain a daily schedule/routine. Implement a self-care routine.      People in my life that I can ask for help: friends or family, trusted teachers/staff/colleagues, trusted members of my community or place of Jain, mental health crisis lines, or 911, my daughter in law, my son, my daughter    Your [facility] has a mental health crisis team you can call 24/7: [facility], [phone]    Other things that are important when I m in crisis: to remember that the feelings I am having right now are temporary, and it won't feel like this forever, and that it is okay and important to ask for help, my children are important to me, my javier and my business    Crisis Lines  Crisis Text Line  Text 580765  " You will be connected with a trained live crisis counselor to provide support.    Por ramirez, franklyno  JAMIE a 064120 o texto a 442-AYUDAME en WhatsApp    National Hope Line  1.800.SUICIDE [2834763]      Community Resources  Fast Tracker  Linking people to mental health and substance use disorder resources  Scuttledogn.org     [place] Mental Health Warm Line  Peer to peer support  Monday thru Saturday, 12 pm to 10 pm  136.780.9895 or 1.411.444.4786  Text \"Support\" to 94211    National [place] on Mental Illness (FLACO)  941.538.9487 or 1.888.FLACO.HELPS      Mental Health Apps  My3  https://Crystalplex.org/    VirtualHopeBox  https://Codemedia/apps/virtual-hope-box/      Additional Information  Today you were seen by a licensed mental health professional through Triage and Transition services, Behavioral Healthcare Providers ([facility])  for a crisis assessment in the Emergency Department at [facility].  It is recommended that you follow up with your established providers (psychiatrist, mental health therapist, and/or primary care doctor - as relevant) as soon as possible. Coordinators from [facility] will be calling you in the next 24-48 hours to ensure that you have the resources you need.  You can also contact [facility] coordinators directly at [phone]. You may have been scheduled for or offered an appointment with a mental health provider. [facility] maintains an extensive network of licensed behavioral health providers to connect patients with the services they need.  We do not charge providers a fee to participate in our referral network.  We match patients with providers based on a patient's specific needs, insurance coverage, and location.  Our first effort will be to refer you to a provider within your care system, and will utilize providers outside your care system as needed.        "

## 2023-03-10 NOTE — ED PROVIDER NOTES
[facility] - Psychiatric Consultation  [facility] Emergency Department    Jonna Bloom MRN: [id number]   Age: 82 year old YOB: 1940     History     Chief Complaint   Patient presents with     Depression     Anxiety     HPI  Jonna Bloom is a 82 year old female with history notable for depression and anxiety who presents to the ED after being sent here by her primary care provider for worsening symptoms of depression and anxiety.  Patient was evaluated by the ED provider, who medically cleared patient to transfer to [facility] for psychiatric assessment, this is reviewed along with all pertinent labs and tests performed.    On examination, patient describes increased feelings of depression recovering from Covid at the end of January. Prior to this since she reports her and her daughter went on a hiking trip in [place] from 1/1-1/16/23, finding it more physically challenging for her since sustaining a leg injury after a fall several years ago. She is use to having more stamina as she is a runner and has been physically active most her life, including her senior years. She now reports depressive symptoms of low energy, increased fatigue and sleep, isolating, lack of enjoyment in life, mood lability, including irritability and crying, feels hopeless with passive suicidal ideation, difficulty focusing.  She reports taking lexapro in the past with therapeutic benefit and is interested in seeking medication recommendations as she would like to feel better so she can continue to function at baseline.    Past Medical History  Past Medical History:   Diagnosis Date     Anxiety state, unspecified      Arthritis      Chronic rhinitis      Endometriosis      Gastro-oesophageal reflux disease      Gluten intolerance      Insomnia, unspecified      Sleep apnea     uses mouth appliance     Past Surgical History:   Procedure Laterality Date     ARTHROPLASTY FINGER(S)  3/7/2014    Procedure: ARTHROPLASTY  "FINGER(S);  RIGHT SMALL FINGER PROXIMAL INTERPHALANGEAL SILICONE ARTHROPLASTY  ;  Surgeon: Candie Smith MD;  Location:  [facility]     BREAST SURGERY      augmentation     CHOLECYSTECTOMY       GYN SURGERY      T.L., ovarian resection     ORTHOPEDIC SURGERY      hand, [facility]     SOFT TISSUE SURGERY      excision basal cell CA     escitalopram (LEXAPRO) 5 MG tablet  rosuvastatin (CRESTOR) 5 MG tablet  zolpidem (AMBIEN) 5 MG tablet  denosumab (PROLIA) 60 MG/ML SOSY injection  Omega-3 Fatty Acids (OMEGA-3 FISH OIL PO)  polycarbophil (FIBERCON) 625 MG tablet  Urea 40 % CREA  vitamin D3 (CHOLECALCIFEROL) 2000 units (50 mcg) tablet      Allergies   Allergen Reactions     Sulfa Drugs Anaphylaxis     Gluten Meal      Penicillins Rash     Family History  History reviewed. No pertinent family history.  Social History   Social History     Tobacco Use     Smoking status: Never     Smokeless tobacco: Never   Vaping Use     Vaping Use: Never used   Substance Use Topics     Alcohol use: Yes     Drug use: No          Review of Systems  A complete review of systems was performed with pertinent positives and negatives noted in the HPI, and all other systems negative.    Physical Examination   BP: (!) 185/89  Pulse: 59  Temp: 97.5  F (36.4  C)  Resp: 18  Height: 170.2 cm (5' 7\")  Weight: 57.5 kg (126 lb 11.2 oz)  SpO2: 99 %    Physical Exam  General: Appears stated age.   Neuro: Alert and fully oriented. Extremities appear to demonstrate normal strength on visual inspection.   Integumentary/Skin: no rash visualized, normal color    Psychiatric Examination   Appearance: awake, alert  Attitude:  cooperative  Eye Contact:  good  Mood:  depressed  Affect:  mood congruent  Speech:  clear, coherent  Psychomotor Behavior:  no evidence of tardive dyskinesia, dystonia, or tics  Thought Process:  logical, linear and goal oriented  Associations:  no loose associations  Thought Content:  no evidence of psychotic thought and passive " suicidal ideation present  Insight:  good  Judgement:  intact  Oriented to:  time, person, and place  Attention Span and Concentration:  intact  Recent and Remote Memory:  intact  Language: able to name/identify objects without impairment  Fund of Knowledge: intact with awareness of current and past events    ED Course        Labs Ordered and Resulted from Time of ED Arrival to Time of ED Departure   COVID-19 VIRUS (CORONAVIRUS) BY PCR - Normal       Result Value    SARS CoV2 PCR Negative         Assessments & Plan (with Medical Decision Making)   Patient presenting with depressed mood with fleeting suicidal thoughts in the context of MDD further compounded by recent illness and possible triggering of PTSD as patient's reports a significant history of trauma throughout her life, which she seems to have compartmentalized.     Nursing notes reviewed noting no acute issues.     I have reviewed the assessment completed by the [facility].     After a period of working with the treatment team on the [facility] unit, the patient's mental state improved to allow a safe transition to outpatient care. After counseling on the diagnosis, work-up, and treatment plan, the patient was discharged. Close follow-up with a psychiatrist and/or therapist was recommended and community psychiatric resources were provided. Patient is to return to the ED if any urgent or potentially life-threatening concerns.     At the time of discharge, the patient's acute suicide risk was determined to be low due to the following factors: Reduction in the intensity of mood/anxiety symptoms that preceded the admission, denial of suicidal thoughts, denies feeling helpless or helpless, not currently under the influence of alcohol or illicit substances, denies experiencing command hallucinations, no immediate access to firearms. The patient's acute risk could be higher if noncompliant with their treatment plan, medications, follow-up appointments or using illicit  substances or alcohol. Protective factors include: social supports, children, stable housing, Hoahaoism beliefs.    Preliminary diagnosis:    ICD-10-CM    1. Anxiety  F41.9       2. PTSD (post-traumatic stress disorder)  F43.10       3. Major depression, recurrent (H)  F33.9       4. Suicidal ideation  R45.851            Treatment Plan:  -discharge home with safety plan in place.  -start Lexapro 2.5 mg for 4 days and then increase to 5 mg daily to target depression.    -Medication education provided this visit includes, rationale for medication, importance of compliance, medication interactions, and common side effects. Patient agreeable.  -Discussed differential diagnosis, impressions, and prognosis.  -Educated patient regarding their mental health concern.  -supportive psychotherapy provided regarding life changes.  -referral for psychiatry for medication management.  -referral for transition clinic.    --  JOVITA Jones CNP   [facility] EMERGENCY DEPT  [facility] Unit  3/9/2023      Josi Soni APRN CNP  03/13/23 2008
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
## 2023-03-10 NOTE — TELEPHONE ENCOUNTER
First attempt to contact pt.  left a VM with TC contact info and encouraged a phone call back to schedule initial therapy appointment. Writer will postpone for tomorrow.    Nataliia Dempsey  03/10/2023  854    ----- Message from Radha Acuña sent at 3/9/2023  7:37 PM CST -----  Regarding: Therapy  Transition Clinic Referral   [place]     PATIENT HAS MEDICARE!!    Please Check Type of Referral Requested:       __X__THERAPY: The Transition clinic is able to schedule patients without current medical insurance; these patient will be referred to our Social Work Care Coordinator for Medical Insurance              Assistance. We are open for referral for psychotherapy. Patient is referred from:  [facility]      ____MEDICATION:  Referrals for Medication are ONLY accepted from the following areas (select): NA                                    Suboxone and Opioid Management Referrals are automatically denied. [facility] Psychiatry cannot see patient without active medical insurance.   TC Psychiatry cannot accept patient with next level of care scheduled with PCP      GUARDIAN: If your patient is not their own Guardian, please provide the following:    Guardian Name:  Guardian Contact Information (Phone & Email) :  Guardian Address:     FOSTER CARE PROVIDER: If your patient lives at a Licensed Foster Care, please provide the following:    Foster Provider:  Foster Provider Contact Information (Phone & Email):  Foster Provider Address:         Referring Provider Contact Name: Carrie Acuña ; Phone Number: [phone]    Reason for Transition Clinic Referral: Therapy patient has medicare and appointments are booked out. Pt will need assistance with scheduling long term therapy someone specializing in trauma.    Next Level of Care Patient Will Be Transitioned To: Med Management scheduled not therapy.   Provider(s)  Location   Date/Time     What Would Be Helpful from the Transition Clinic:     From ED Note  Patient comes  in with some significant depression and anxiety that is gotten worse.  She lives alone.  Denies a plan but her daughter and the patient both agree that she has had a few thoughts of harming herself.  No attempts in the past.  I feel that she needs further evaluation by psychiatry.  She is not sick at this time and her COVID is negative.  She has a very healthy history with minimal medical history other than hyperlipidemia.  She is medically cleared at this time to go to [facility] when a bed opens.     Needs: NO    Does Patient Have Access to Technology: Unknown prefers in person    Patient E-mail Address: [email or web address]    Current Patient Phone Number: [phone];     Clinician Gender Preference (if applicable): Someone experienced with Trauma    Patient location preference: In person    Radha EDWARDS

## 2023-03-10 NOTE — ED NOTES
Patient agreeable to discharge plan. Discharge instructions reviewed with patient including follow-up care plan. Follow up psychiatry appointment scheduled for 4/10 with Hodan. Medications: script for Lexapro sent to patient's pharmacy. Reviewed safety plan and outpatient resources. Endorses fleeting SI, no plan or intent at this time. All belongings that were brought into the hospital have been returned to patient. Escorted off the unit at 2050 accompanied by [facility] staff. Discharged to home via family members.

## 2023-03-11 ENCOUNTER — TELEPHONE (OUTPATIENT)
Dept: BEHAVIORAL HEALTH | Facility: CLINIC | Age: 83
End: 2023-03-11
Payer: COMMERCIAL

## 2023-03-11 NOTE — TELEPHONE ENCOUNTER
Final attempt. Writer left voicemail encouraging a call back to scheduled tc therapy. Referral source notified, added in tracker and doned.    [facility]  Care Coordinator  3.11  ----- Message from Radha Acuña sent at 3/9/2023  7:37 PM CST -----  Regarding: Therapy  Transition Clinic Referral   [place]     PATIENT HAS MEDICARE!!    Please Check Type of Referral Requested:       __X__THERAPY: The Transition clinic is able to schedule patients without current medical insurance; these patient will be referred to our Social Work Care Coordinator for Medical Insurance              Assistance. We are open for referral for psychotherapy. Patient is referred from:  [facility]      ____MEDICATION:  Referrals for Medication are ONLY accepted from the following areas (select): NA                                    Suboxone and Opioid Management Referrals are automatically denied. [facility] cannot see patient without active medical insurance.   [facility] cannot accept patient with next level of care scheduled with PCP      GUARDIAN: If your patient is not their own Guardian, please provide the following:    Guardian Name:  Guardian Contact Information (Phone & Email) :  Guardian Address:     FOSTER CARE PROVIDER: If your patient lives at a Licensed Foster Care, please provide the following:    Foster Provider:  Foster Provider Contact Information (Phone & Email):  Foster Provider Address:         Referring Provider Contact Name: Carrie Acuña ; Phone Number: [phone]    Reason for Transition Clinic Referral: Therapy patient has medicare and appointments are booked out. Pt will need assistance with scheduling long term therapy someone specializing in trauma.    Next Level of Care Patient Will Be Transitioned To: Med Management scheduled not therapy.   Provider(s)  Location   Date/Time     What Would Be Helpful from the Transition Clinic:     From ED Note  Patient comes in with some significant  depression and anxiety that is gotten worse.  She lives alone.  Denies a plan but her daughter and the patient both agree that she has had a few thoughts of harming herself.  No attempts in the past.  I feel that she needs further evaluation by psychiatry.  She is not sick at this time and her COVID is negative.  She has a very healthy history with minimal medical history other than hyperlipidemia.  She is medically cleared at this time to go to [facility] when a bed opens.     Needs: NO    Does Patient Have Access to Technology: Unknown prefers in person    Patient E-mail Address: [email or web address]    Current Patient Phone Number: [phone];     Clinician Gender Preference (if applicable): Someone experienced with Trauma    Patient location preference: In person    Radha EDWARDS

## 2023-03-21 ENCOUNTER — THERAPY VISIT (OUTPATIENT)
Dept: SLEEP MEDICINE | Facility: CLINIC | Age: 83
End: 2023-03-21
Payer: COMMERCIAL

## 2023-03-21 DIAGNOSIS — G47.33 OBSTRUCTIVE SLEEP APNEA: ICD-10-CM

## 2023-03-21 PROCEDURE — 95811 POLYSOM 6/>YRS CPAP 4/> PARM: CPT | Performed by: SURGERY

## 2023-03-22 NOTE — PROGRESS NOTES
Completed a split night PSG per provider order.    Preliminary AHI >10.  A final therapeutic PAP pressure was achieved.

## 2023-03-24 LAB — SLPCOMP: NORMAL

## 2023-04-10 NOTE — PROGRESS NOTES
CPAP Follow-Up Visit:    Date on this visit: 4/11/2023    Jonna Blomo has a follow-up visit today to review her Split PSG from 3/21/2023 and review her treatment for ANGELA, RLS and insomnia.        PSG  ANGELA: 2004 study at [place] with AHI of 17.6/hr. Her AHI was 57.7/hr on her back and 0.6/hr off her back.  She had tried positional restriction and a dental appliance but developed jaw pain.      When last seen, Jonna was planning on travelling to [place] and wanted to see if she could travel without CPAP. Oximetry was performed with positional restriction to see if that would be sufficient treatment alone.  A repeat split-night PSG could not be scheduled before her trip, but was performed after her trip.     Oximetry was performed 11/23/22 with a positional restriction device and off of CPAP.  The study showed a valid recording time of 9:07 with a 4% desaturation index of 3.5/hr.  The mean oxygen saturation was 94.4% and the lowest SpO2 was 78%.  The patient spent 4.2 minutes below 89% SpO2.    Repeat Split PSG 3/21/2023  Wt: 126#  Diagnostic PSG  Sleep Architecture: Sleep fragmentation, No REM sleep.  The total recording time of the polysomnogram was 276.9 minutes. The total sleep time was 198.0 minutes. Sleep latency was 4.9 minutes. REM latency was - minutes. Arousal index was 47.9 arousals per hour. Sleep efficiency was 71.5%. Wake after sleep onset was 74.0 minutes. The patient spent 24.7% of total sleep time in Stage N1, 45.7% in Stage N2, 29.5% in Stage N3, and 0.0% in REM.     Respiration: Moderate ANGELA while lateral.     Events ? The polysomnogram revealed a presence of 58 obstructive, - central, and - mixed apneas resulting in an apnea index of 17.6 events per hour. There were 7 obstructive hypopneas and - central hypopneas resulting in an obstructive hypopnea index of 2.1 and central hypopnea index of - events per hour. The combined apnea/hypopnea index was 19.7 events per hour (central apnea/hypopnea  index was - events per hour).  The REM AHI was - events per hour. The supine AHI was - events per hour. The RERA index was 0.6 events per hour. The RDI was 20.3 events per hour.    Snoring - was reported as moderate.    Respiratory rate and pattern - was notable for normal respiratory rate and pattern.    Sustained Sleep Associated Hypoventilation - Transcutaneous carbon dioxide monitoring was not used.    Sleep Associated Hypoxemia - (Greater than 5 minutes O2 sat at or below 88%) was not present. Baseline oxygen saturation was 93.6%. Lowest oxygen saturation was 88.0%. Time spent less than or equal to 88% was 0.7 minutes. Time spent less than or equal to 89% was 3.1 minutes.      Treatment PSG   Sleep Architecture: Decreased sleep fragmentation, + REM sleep  At 02:49:51 AM the patient was placed on PAP treatment and was titrated at pressures ranging from CPAP 5 cmH2O up to CPAP 5 cmH2O. The total recording time of the treatment portion of the study was 221.1 minutes. The total sleep time was 129.0 minutes. During the treatment portion of the study the sleep latency was 12.0 minutes. REM latency was 21.0 minutes. Arousal index was 21.4 arousals per hour. Sleep efficiency was 58.3%. Wake after sleep onset was 80.0 minutes. The patient spent 19.0% of total sleep time in Stage N1, 49.2% in Stage N2, 0.0% in Stage N3, and 31.8% in REM. Time in REM supine was - minutes.      Respiration: Sleep disordered breathing noted on the baseline portion of the study was nearly fully resolved with positive airway pressure therapy.    The final pressure was CPAP 5 cmH2O with an AHI of 0 events per hour. Time in REM supine on final pressure was - minutes.      Movement Activity:     Periodic Limb Movements  ? During the diagnostic portion of the study, there were 36 PLMs recorded. The PLM index was 10.9 movements per hour. The PLM Arousal Index was 7.0 per hour.  ? During the treatment portion of the study, there were 0 PLMs  recorded.    REM EMG Activity - Excessive muscle activity was not present.    Nocturnal Behavior - Abnormal sleep related behaviors were not noted.    Bruxism - None apparent.     Cardiac Summary: Limited 1 lead EKG study whose signal was frequently obscured by artifact. Regardless, the study predominantly demonstrated narrow QRS complexes preceded by P waves suggestive of sinus rhythm.   During the diagnostic portion of the study, the average pulse rate was 48.1 bpm. The minimum pulse rate was 43.0 bpm while the maximum pulse rate was 64.0 bpm.     During the treatment portion of the study, the average pulse rate was 49.9 bpm. The minimum pulse rate was 44.0 bpm while the maximum pulse rate was 67.0 bpm.          PAP machine: RespirProject Green. Pressure settings: 5-15 cm.       The compliance data shows that the patient used the CPAP for 85% of nights for >4 hours.  The 90th% pressure is 7.3 cm.  The average time in large leak is 0.  The average nightly usage is 520 min.  The average AHI is 1.7/hr.         Interface:  Mask: Dreamwear nasal mask. She has 3 different masks, a full face, Dreamwear nasal and N20. It appears she has only used the Dreamwear mask.  Chin strap: No  Leak: No  Using Humidifier: Yes  Condensation in hose or mask: Yes/No     Difficulties with therapy:    [-] Snoring with CPAP:  [-] Difficulty tolerating the pressure:  [-] Epistaxis/dry nose:   [-] Nasal congestion:  [-] Dry mouth:  [-] Mouth breathing:   [-] Pain/skin breakdown:        Past medical/surgical history, family history, social history, medications and allergies were reviewed.      Problem List:  Patient Active Problem List    Diagnosis Date Noted     Obstructive sleep apnea 08/04/2015     Priority: Medium     Problem list name updated by automated process. Provider to review       Hamstring muscle strain 04/16/2010     Priority: Medium        Impression/Plan:    (G47.33) Obstructive sleep apnea  (primary encounter diagnosis)  Comment:  This study was performed to reassess efficacy of positional restriction. Her AHI was 19.7/hr when lateral. CPAP 5 cm was effective.  Plan: Comprehensive DME        Continue auto CPAP 5-15 cm. A prescription was written for new supplies. We reviewed recommendations for cleaning and replacing supplies. She was told she could travel without it, but try to use it most of the time you are sleeping.        She will follow up with me in about 1 year(s).     26 minutes were spent on the date of the encounter doing chart review, history and exam, documentation and further activities as noted above.     Bennett Goltz, PA-C    CC: No ref. provider found

## 2023-04-11 ENCOUNTER — OFFICE VISIT (OUTPATIENT)
Dept: SLEEP MEDICINE | Facility: CLINIC | Age: 83
End: 2023-04-11
Payer: COMMERCIAL

## 2023-04-11 VITALS
DIASTOLIC BLOOD PRESSURE: 73 MMHG | OXYGEN SATURATION: 97 % | SYSTOLIC BLOOD PRESSURE: 122 MMHG | BODY MASS INDEX: 20.4 KG/M2 | HEART RATE: 60 BPM | WEIGHT: 130 LBS | HEIGHT: 67 IN

## 2023-04-11 DIAGNOSIS — G47.33 OBSTRUCTIVE SLEEP APNEA: Primary | ICD-10-CM

## 2023-04-11 PROCEDURE — 99213 OFFICE O/P EST LOW 20 MIN: CPT | Performed by: PHYSICIAN ASSISTANT

## 2023-04-11 RX ORDER — MIRTAZAPINE 7.5 MG/1
7.5 TABLET, FILM COATED ORAL AT BEDTIME
COMMUNITY

## 2023-04-11 ASSESSMENT — SLEEP AND FATIGUE QUESTIONNAIRES
HOW LIKELY ARE YOU TO NOD OFF OR FALL ASLEEP WHILE SITTING AND TALKING TO SOMEONE: WOULD NEVER DOZE
HOW LIKELY ARE YOU TO NOD OFF OR FALL ASLEEP WHEN YOU ARE A PASSENGER IN A CAR FOR AN HOUR WITHOUT A BREAK: SLIGHT CHANCE OF DOZING
HOW LIKELY ARE YOU TO NOD OFF OR FALL ASLEEP WHILE LYING DOWN TO REST IN THE AFTERNOON WHEN CIRCUMSTANCES PERMIT: SLIGHT CHANCE OF DOZING
HOW LIKELY ARE YOU TO NOD OFF OR FALL ASLEEP WHILE SITTING QUIETLY AFTER LUNCH WITHOUT ALCOHOL: WOULD NEVER DOZE
HOW LIKELY ARE YOU TO NOD OFF OR FALL ASLEEP IN A CAR, WHILE STOPPED FOR A FEW MINUTES IN TRAFFIC: WOULD NEVER DOZE
HOW LIKELY ARE YOU TO NOD OFF OR FALL ASLEEP WHILE SITTING INACTIVE IN A PUBLIC PLACE: WOULD NEVER DOZE
HOW LIKELY ARE YOU TO NOD OFF OR FALL ASLEEP WHILE SITTING AND READING: WOULD NEVER DOZE
HOW LIKELY ARE YOU TO NOD OFF OR FALL ASLEEP WHILE WATCHING TV: SLIGHT CHANCE OF DOZING

## 2023-04-11 NOTE — NURSING NOTE
"Chief Complaint   Patient presents with     Study Results     PSG f/u       Initial /73   Pulse 60   Ht 1.702 m (5' 7\")   Wt 59 kg (130 lb)   SpO2 97%   BMI 20.36 kg/m   Estimated body mass index is 20.36 kg/m  as calculated from the following:    Height as of this encounter: 1.702 m (5' 7\").    Weight as of this encounter: 59 kg (130 lb).    Medication Reconciliation: complete  ESS 3  Theresa Brower MA  "

## 2023-07-19 NOTE — PROCEDURES
" SLEEP STUDY INTERPRETATION  SPLIT NIGHT STUDY      Patient: ANN RASCON  YOB: 1940  Study Date: 3/21/2023  MRN: [id number]  Referring Provider: Self Referal  Ordering Provider: Goltz, PA-C, Bennett    Indications for Polysomnography: The patient is a 82 year old Female who is 5' 7\" and weighs 126.0 lbs. Her BMI is 19.8, Clark sleepiness scale 3 and neck circumference is - cm. Relevant medical history includes history of moderate ANGELA treated with positional restriction device. A diagnostic polysomnogram was performed to evaluate for current degree of sleep disordered breathing. After 198.0 minutes of sleep time the patient exhibited sufficient respiratory events qualifying her for a CPAP trial which was then initiated.    Polysomnogram Data: A full night polysomnogram recorded the standard physiologic parameters including EEG, EOG, EMG, ECG, nasal and oral airflow. Respiratory parameters of chest and abdominal movements were recorded with respiratory inductance plethysmography. Oxygen saturation was recorded by pulse oximetry.  Hypopnea scoring rule used: 1B 4%    Diagnostic PSG  Sleep Architecture: Sleep fragmentation, No REM sleep.  The total recording time of the polysomnogram was 276.9 minutes. The total sleep time was 198.0 minutes. Sleep latency was 4.9 minutes. REM latency was - minutes. Arousal index was 47.9 arousals per hour. Sleep efficiency was 71.5%. Wake after sleep onset was 74.0 minutes. The patient spent 24.7% of total sleep time in Stage N1, 45.7% in Stage N2, 29.5% in Stage N3, and 0.0% in REM.    Respiration: Moderate ANGELA while lateral.     Events ? The polysomnogram revealed a presence of 58 obstructive, - central, and - mixed apneas resulting in an apnea index of 17.6 events per hour. There were 7 obstructive hypopneas and - central hypopneas resulting in an obstructive hypopnea index of 2.1 and central hypopnea index of - events per hour. The combined apnea/hypopnea index " Impression: Dry eye syndrome of bilateral lacrimal glands: H04.123. Plan: Educated pt on dry eye, the effects it can have on the eyes and VA, along with treatment options. Recommend OTC artificial tear use 2-4x daily w/ emphasis on QAM and QHS doses. Discussed possible improvement with thicker gel or ointment QHS. Recommend warm compress w/ lid massage. If symptoms continue/worsen will consider prescription drug therapy. was 19.7 events per hour (central apnea/hypopnea index was - events per hour).  The REM AHI was - events per hour. The supine AHI was - events per hour. The RERA index was 0.6 events per hour. The RDI was 20.3 events per hour.    Snoring - was reported as moderate.    Respiratory rate and pattern - was notable for normal respiratory rate and pattern.    Sustained Sleep Associated Hypoventilation - Transcutaneous carbon dioxide monitoring was not used.    Sleep Associated Hypoxemia - (Greater than 5 minutes O2 sat at or below 88%) was not present. Baseline oxygen saturation was 93.6%. Lowest oxygen saturation was 88.0%. Time spent less than or equal to 88% was 0.7 minutes. Time spent less than or equal to 89% was 3.1 minutes.     Treatment PSG  Sleep Architecture: Decreased sleep fragmentation, + REM sleep  At 02:49:51 AM the patient was placed on PAP treatment and was titrated at pressures ranging from CPAP 5 cmH2O up to CPAP 5 cmH2O. The total recording time of the treatment portion of the study was 221.1 minutes. The total sleep time was 129.0 minutes. During the treatment portion of the study the sleep latency was 12.0 minutes. REM latency was 21.0 minutes. Arousal index was 21.4 arousals per hour. Sleep efficiency was 58.3%. Wake after sleep onset was 80.0 minutes. The patient spent 19.0% of total sleep time in Stage N1, 49.2% in Stage N2, 0.0% in Stage N3, and 31.8% in REM. Time in REM supine was - minutes.     Respiration: Sleep disordered breathing noted on the baseline portion of the study was nearly fully resolved with positive airway pressure therapy.    The final pressure was CPAP 5 cmH2O with an AHI of 0 events per hour. Time in REM supine on final pressure was - minutes.     Movement Activity:     Periodic Limb Movements  o During the diagnostic portion of the study, there were 36 PLMs recorded. The PLM index was 10.9 movements per hour. The PLM Arousal Index was 7.0 per hour.  o During the treatment  portion of the study, there were 0 PLMs recorded.    REM EMG Activity - Excessive muscle activity was not present.    Nocturnal Behavior - Abnormal sleep related behaviors were not noted.    Bruxism - None apparent.    Cardiac Summary: Limited 1 lead EKG study whose signal was frequently obscured by artifact. Regardless, the study predominantly demonstrated narrow QRS complexes preceded by P waves suggestive of sinus rhythm.   During the diagnostic portion of the study, the average pulse rate was 48.1 bpm. The minimum pulse rate was 43.0 bpm while the maximum pulse rate was 64.0 bpm.    During the treatment portion of the study, the average pulse rate was 49.9 bpm. The minimum pulse rate was 44.0 bpm while the maximum pulse rate was 67.0 bpm.     Assessment:     Moderate ANGELA while lateral.     Sleep disordered breathing noted on the baseline portion of the study was nearly fully resolved with positive airway pressure therapy.    Recommendations:    Treatment of ANGELA with Auto?titrating PAP therapy. Recommend clinical follow up with sleep management team, including review of compliance measures.    Advice regarding the risks of drowsy driving.    Suggest optimizing sleep schedule and avoiding sleep deprivation.    Diagnostic Codes: G47.33      Wali Bentley MD 3-23-23  Diplomate, ABPN Sleep Medicine

## 2024-03-16 ENCOUNTER — HEALTH MAINTENANCE LETTER (OUTPATIENT)
Age: 84
End: 2024-03-16

## 2024-07-09 ENCOUNTER — VIRTUAL VISIT (OUTPATIENT)
Dept: SLEEP MEDICINE | Facility: CLINIC | Age: 84
End: 2024-07-09
Payer: COMMERCIAL

## 2024-07-09 VITALS — BODY MASS INDEX: 20.16 KG/M2 | HEIGHT: 68 IN | WEIGHT: 133 LBS

## 2024-07-09 DIAGNOSIS — G47.33 OBSTRUCTIVE SLEEP APNEA: Primary | ICD-10-CM

## 2024-07-09 DIAGNOSIS — G47.00 INSOMNIA, UNSPECIFIED TYPE: ICD-10-CM

## 2024-07-09 PROBLEM — I47.10 PAROXYSMAL SUPRAVENTRICULAR TACHYCARDIA (H): Status: ACTIVE | Noted: 2024-01-13

## 2024-07-09 PROBLEM — F41.8 MIXED ANXIETY DEPRESSIVE DISORDER: Status: ACTIVE | Noted: 2018-07-31

## 2024-07-09 PROBLEM — I71.21 ANEURYSM OF ASCENDING AORTA WITHOUT RUPTURE (H): Status: ACTIVE | Noted: 2024-04-10

## 2024-07-09 PROBLEM — M26.629 ARTHRALGIA OF TEMPOROMANDIBULAR JOINT: Status: ACTIVE | Noted: 2018-07-05

## 2024-07-09 PROBLEM — M26.639 ARTICULAR DISC DISORDER OF TEMPOROMANDIBULAR JOINT: Status: ACTIVE | Noted: 2018-07-05

## 2024-07-09 PROBLEM — K21.9 GERD (GASTROESOPHAGEAL REFLUX DISEASE): Status: ACTIVE | Noted: 2024-01-13

## 2024-07-09 PROBLEM — Z96.643 HISTORY OF BILATERAL HIP REPLACEMENTS: Status: ACTIVE | Noted: 2024-01-10

## 2024-07-09 PROBLEM — K58.0 IRRITABLE BOWEL SYNDROME WITH DIARRHEA: Status: ACTIVE | Noted: 2022-11-04

## 2024-07-09 PROBLEM — F40.243 FEAR OF FLYING: Status: ACTIVE | Noted: 2017-09-13

## 2024-07-09 PROBLEM — E03.8 SUBCLINICAL HYPOTHYROIDISM: Status: ACTIVE | Noted: 2017-03-29

## 2024-07-09 PROBLEM — F41.1 ANXIETY STATE: Status: ACTIVE | Noted: 2024-01-13

## 2024-07-09 PROBLEM — M81.0 OSTEOPOROSIS: Status: ACTIVE | Noted: 2018-08-08

## 2024-07-09 PROCEDURE — 99214 OFFICE O/P EST MOD 30 MIN: CPT | Mod: 95 | Performed by: NURSE PRACTITIONER

## 2024-07-09 RX ORDER — ZOLPIDEM TARTRATE 5 MG/1
TABLET ORAL
COMMUNITY
Start: 2023-11-20

## 2024-07-09 ASSESSMENT — SLEEP AND FATIGUE QUESTIONNAIRES
HOW LIKELY ARE YOU TO NOD OFF OR FALL ASLEEP WHEN YOU ARE A PASSENGER IN A CAR FOR AN HOUR WITHOUT A BREAK: WOULD NEVER DOZE
HOW LIKELY ARE YOU TO NOD OFF OR FALL ASLEEP WHILE SITTING QUIETLY AFTER LUNCH WITHOUT ALCOHOL: WOULD NEVER DOZE
HOW LIKELY ARE YOU TO NOD OFF OR FALL ASLEEP WHILE SITTING AND TALKING TO SOMEONE: WOULD NEVER DOZE
HOW LIKELY ARE YOU TO NOD OFF OR FALL ASLEEP WHILE LYING DOWN TO REST IN THE AFTERNOON WHEN CIRCUMSTANCES PERMIT: SLIGHT CHANCE OF DOZING
HOW LIKELY ARE YOU TO NOD OFF OR FALL ASLEEP IN A CAR, WHILE STOPPED FOR A FEW MINUTES IN TRAFFIC: WOULD NEVER DOZE
HOW LIKELY ARE YOU TO NOD OFF OR FALL ASLEEP WHILE SITTING INACTIVE IN A PUBLIC PLACE: WOULD NEVER DOZE
HOW LIKELY ARE YOU TO NOD OFF OR FALL ASLEEP WHILE WATCHING TV: SLIGHT CHANCE OF DOZING
HOW LIKELY ARE YOU TO NOD OFF OR FALL ASLEEP WHILE SITTING AND READING: SLIGHT CHANCE OF DOZING

## 2024-07-09 ASSESSMENT — PAIN SCALES - GENERAL: PAINLEVEL: MODERATE PAIN (4)

## 2024-07-09 NOTE — PATIENT INSTRUCTIONS
"MY INFORMATION ON SLEEP APNEA-  Jonna Bloom    DOCTOR : JOVITA Georges CNP  SLEEP CENTER :      MY CONTACT NUMBER:   [facility]  [phone]  [facility]   [phone]  [facility]   [phone]      [facility]  [phone]  [facility]   [phone]    DME:  [facility] [place] [address], [address]  [place] [address]  Phone: [phone]    Hours:  Mon - Fri: 8:00 a.m. - 4:30 p.m.  Sat: Closed  Sun: Closed      Key Points:  1. What is Obstructive Sleep Apnea (ANGELA)? ANGELA is the most common type of sleep apnea. Apnea literally means, \"without breath.\" It is characterized by repetitive pauses in breathing, despite continued effort to breathe, and is usually associated with a reduction in blood oxygen saturation. Apneas can last 10 to over 60 seconds. It is caused by narrowing or collapse of the upper airway as muscles relax during sleep.   2. What are the consequences of ANGELA? Symptoms include: daytime sleepiness- possibly increasing the risk of falling asleep while driving, unrefreshing/restless sleep, snoring, insomnia, waking frequently to urinate, waking with heartburn or reflux, reduced concentration and memory, and morning headaches. Other health consequences may include development of high blood pressure. Untreated ANGELA also can contribute to heart disease, stroke and diabetes.   3. What are the treatment options? In most situations, sleep apnea is a lifelong disease that must be managed with daily therapy. Continuous Positive Airway (CPAP) is the most reliable treatment. A mouthguard to hold your jaw forward is usually the next most reliable option. Other options include postioning devices (to keep you off your back), nasal valves, tongue retaining device, weight loss, surgery. There is more detail about these options toward the end of this " document.  4. What are the most important things to remember about using CPAP?     WHERE CAN I FIND MORE INFORMATION?    American Academy of Sleep Medicine Patient information on sleep disorders:  http://yoursleep.aasmnet.org    CPAP -  WHY AND HOW?                 Continuous positive airway pressure, or CPAP, is the most effective treatment for obstructive sleep apnea. It works by blowing room air, through a mask, to hold your throat open. A decision to use CPAP is a major step forward in the pursuit of a healthier life. The successful use of CPAP will help you breathe easier, sleep better and live healthier. Using CPAP can be a positive experience if you keep these chan points in mind:  Commitment  CPAP is not a quick fix for your problem. It involves a long-term commitment to improve your sleep and your health.    Communication  Stay in close communication with both your sleep doctor and your CPAP supplier. Ask lots of questions and seek help when you need it.    Consistency  Use CPAP all night, every night and for every nap. You will receive the maximum health benefits from CPAP when you use it every time that you sleep. This will also make it easier for your body to adjust to the treatment.    Correction  The first machine and mask that you try may not be the best ones for you. Work with your sleep doctor and your CPAP supplier to make corrections to your equipment selection. Ask about trying a different type of machine or mask if you have ongoing problems. Make sure that your mask is a good fit and learn to use your equipment properly.    Challenge  Tell a family member or close friend to ask you each morning if you used your CPAP the previous night. Have someone to challenge you to give it your best effort.    Connection   Your adjustment to CPAP will be easier if you are able to connect with others who use the same treatment. Ask your sleep doctor if there is a support group in your area for people who have  "sleep apnea, or look for one on the Internet.  Comfort   Increase your level of comfort by using a saline spray, decongestant or heated humidifier if CPAP irritates your nose, mouth or throat. Use your unit's \"ramp\" setting to slowly get used to the air pressure level. There may be soft pads you can buy that will fit over your mask straps. Look on www.CPAP.com for accessories that can help make CPAP use more comfortable.  Cleaning   Clean your mask, tubing and headgear on a regular basis. Put this time in your schedule so that you don't forget to do it. Check and replace the filters for your CPAP unit and humidifier.    Completion   Although you are never finished with CPAP therapy, you should reward yourself by celebrating the completion of your first month of treatment. Expect this first month to be your hardest period of adjustment. It will involve some trial and error as you find the machine, mask and pressure settings that are right for you.    Continuation  After your first month of treatment, continue to make a daily commitment to use your CPAP all night, every night and for every nap.    CPAP-Tips to starting with success:  Begin using your CPAP for short periods of time during the day while you watch TV or read.    Use CPAP every night and for every nap. Using it less often reduces the health benefits and makes it harder for your body to get used to it.    Newer CPAP models are virtually silent; however, if you find the sound of your CPAP machine to be bothersome, place the unit under your bed to dampen the sound.     Make small adjustments to your mask, tubing, straps and headgear until you get the right fit. Tightening the mask may actually worsen the leak.  If it leaves significant marks on your face or irritates the bridge of your nose, it may not be the best mask for you.  Speak with the person who supplied the mask and consider trying other masks. Insurances will allow you to try different masks " during the first month of starting CPAP.  Insurance also covers a new mask, hose and filter about every 6 months.    Use a saline nasal spray to ease mild nasal congestion. Neti-Pot or saline nasal rinses may also help. Nasal gel sprays can help reduce nasal dryness.  Biotene mouthwash can be helpful to protect your teeth if you experience frequent dry mouth.  Dry mouth may be a sign of air escaping out of your mouth or out of the mask in the case of a full face mask.  Speak with your provider if you expect that is the case.     Take a nasal decongestant to relieve more severe nasal or sinus congestion.  Do not use Afrin (oxymetazoline) nasal spray more than 3 days in a row.  Speak with your sleep doctor if your nasal congestion is chronic.    Use a heated humidifier that fits your CPAP model to enhance your breathing comfort. Adjust the heat setting up if you get a dry nose or throat, down if you get condensation in the hose or mask.  Position the CPAP lower than you so that any condensation in the hose drains back into the machine rather than towards the mask.    Try a system that uses nasal pillows if traditional masks give you problems.    Clean your mask, tubing and headgear once a week. Make sure the equipment dries fully.    Regularly check and replace the filters for your CPAP unit and humidifier.    Work closely with your sleep provider and your CPAP supplier to make sure that you have the machine, mask and air pressure setting that works best for you. It is better to stop using it and call your provider to solve problems than to lay awake all night frustrated with the device.

## 2024-07-09 NOTE — PROGRESS NOTES
Virtual Visit Details    Type of service:  Video Visit   Video Start Time: 2:41 PM  Video End Time:  3:03 PM    Originating Location (pt. Location): Home    Distant Location (provider location):  Off-site  Platform used for Video Visit: [facility]      Sleep Apnea - Follow-up Visit:    Impression/Plan:  1. Obstructive sleep apnea  2. Insomnia, unspecified type  - Comprehensive DME     Moderate Sleep apnea. Tolerating PAP well. Daytime symptoms are improved.  She continues to use and benefit from PAP therapy.  Overall, doing well on PAP therapy and has no immediate concerns.    Patient with history of chronic insomnia which she manages with zolpidem 5 mg nightly.  This is prescribed by her primary care provider, she reports.  This medication works well for her and also seems to manage some symptoms of restless leg syndrome.    A review of her APAP download data over the last 30 days shows excellent use and compliance and moderate ANGELA that is well-controlled on current pressure settings.    Continue current plan of care.  A comprehensive DME order was placed for new mask and supplies and sent to [facility], today.    Jonna Bloom will follow up in about 1 year(s).     34 minutes spent with patient, all of which were spent face-to-face counseling, consulting, chart review/documentation, and coordinating plan of care on the date of the encounter.      JOVITA Georges CNP  Sleep Medicine      CC:  Isai Castro,         History of Present Illness:  Chief Complaint   Patient presents with    RECHECK    CPAP Follow Up     Annual       Jonna Bloom is an 83-year-old female who presents for annual follow-up of their moderate sleep apnea, managed with CPAP.  She was last seen in the office visit on 4/11/2023 with Bennett Goltz, PA-C in follow-up to review sleep study results.  Of note, she was seen on 11/17/2023 with Dr. Sofi Wang at [facility] in sleep consultation for  ANGELA, insomnia, and RLS.    Previous Study Results:   3/21/2023 [facility] Study (126.0 lbs) - AHI 19.7, RDI 20.3, Supine AHI - REM AHI - Low O2 88.0%, Time Spent ?88% 0.7 minutes / Time Spent ?89% 3.1 minutes. The final pressure was CPAP 5 cmH2O with an AHI of 0 events per hour. Time in REM supine on final pressure was - minutes.     PSG  ANGELA: 2004 study at [place] with AHI of 17.6/hr. Her AHI was 57.7/hr on her back and 0.6/hr off her back.  She had tried positional restriction and a dental appliance but developed jaw pain.    DME: [facility]      Do you use a CPAP Machine at home:  Yes  Overall, on a scale of 0-10 how would you rate your CPAP (0 poor, 10 great):  10    What type of mask do you use:  Nasal pillow  Is your mask comfortable:  Yes  If not, why:      Is your mask leaking:  No  If yes, where do you feel it:    How many night per week does the mask leak (0-7):      Do you notice snoring with mask on:  No  Do you notice gasping arousals with mask on:  No  Are you having significant oral or nasal dryness:  No  Is the pressure setting comfortable:  Yes  If not, why:      What is your typical bedtime:  8-9 PM  How long does it take you to go to sleep on PAP therapy:  few minutes  What time do you typically get out of bed for the day:  6-7 AM  How many hours on average per night are you using PAP therapy:  8.75 hours  How many hours are you sleeping per night:  9 hours  Do you feel well rested in the morning:  Yes      Respironics DreamStation 2  Auto-PAP 5.0 - 15.0 cmH2O 30 day usage data:  6/09/24 - 7/08/24  83.3% of days with > 4 hours of use. 5/30 days with no use.   Average use 8 hours 44 minutes per day.   Average leak 23.66 LPM.  Average % of night in large leak 0%.    CPAP 90% pressure 7.6 cm.   AHI 2.17 events per hour.       EPWORTH SLEEPINESS SCALE         7/9/2024     2:20 PM    [place] Sleepiness Scale ( KIARA.THONG Chacon  [id number]<br>ESS - [place]/English - Final version - 21 Nov 07 - [employer]  [place].)   Sitting and reading Slight chance of dozing   Watching TV Slight chance of dozing   Sitting, inactive in a public place (e.g. a theatre or a meeting) Would never doze   As a passenger in a car for an hour without a break Would never doze   Lying down to rest in the afternoon when circumstances permit Slight chance of dozing   Sitting and talking to someone Would never doze   Sitting quietly after a lunch without alcohol Would never doze   In a car, while stopped for a few minutes in traffic Would never doze   Harlan Score (MC) 3   Harlan Score (Sleep) 3       INSOMNIA SEVERITY INDEX (ELISEO)          7/9/2024     2:19 PM   Insomnia Severity Index (ELISEO)   Difficulty falling asleep 0   Difficulty staying asleep 2   Problems waking up too early 0   How SATISFIED/DISSATISFIED are you with your CURRENT sleep pattern? 2   How NOTICEABLE to others do you think your sleep problem is in terms of impairing the quality of your life? 0   How WORRIED/DISTRESSED are you about your current sleep problem? 0   To what extent do you consider your sleep problem to INTERFERE with your daily functioning (e.g. daytime fatigue, mood, ability to function at work/daily chores, concentration, memory, mood, etc.) CURRENTLY? 0   ELISEO Total Score 4       Guidelines for Scoring/Interpretation:  Total score categories:  0-7 = No clinically significant insomnia   8-14 = Subthreshold insomnia   15-21 = Clinical insomnia (moderate severity)  22-28 = Clinical insomnia (severe)  Used via courtesy of www.myhealth.va.gov with permission from Elver Vance PhD., [facility]      Past medical/surgical history, family history, social history, medications and allergies were reviewed.        Problem List:  Patient Active Problem List    Diagnosis Date Noted    Aneurysm of ascending aorta without rupture (H24) 04/10/2024     Priority: Medium    Anxiety state 01/13/2024     Priority: Medium    GERD (gastroesophageal reflux disease)  01/13/2024     Priority: Medium    Paroxysmal supraventricular tachycardia (H24) 01/13/2024     Priority: Medium    History of bilateral hip replacements 01/10/2024     Priority: Medium    Irritable bowel syndrome with diarrhea 11/04/2022     Priority: Medium     Formatting of this note might be different from the original.     Longstanding h/o IBS, diarrhea predominant     Symptoms worse with increased anxiety and stress  Formatting of this note might be different from the original.   Formatting of this note might be different from the original.      Longstanding h/o IBS, diarrhea predominant      Symptoms worse with increased anxiety and stress      Osteoporosis 08/08/2018     Priority: Medium     Formatting of this note might be different from the original.   Initial bone density 2003 showed lumbar spine osteopenia with a T-score-1.6.  Hip bone density was normal.   Bilateral hip replacements and progressive DJD lead to radius bone density measured 2011 showing osteopenia with a T-score -2.2.  Bone density 2017 osteoporosis in the 1/3 radius T-score -2.9.      CT bone strength assessment 2018 T12 vertebral strength in the fragile range.      Fractures:     Left wrist years ago when she slipped on the ice and tripped over her dog.     Possible rib fracture 2017.   2020 fell running and sustained L wrist fx and concussion      Treatment:   Alendronate, took for a couple weeks developed severe musculoskeletal pain and discontinued.   Risedronate 35 mg weekly 2018    Prolia 2021 - present       Last Assessment & Plan:    Formatting of this note might be different from the original.   Endo 10/2020: She will consider possible switch to Prolia. Local primary care team to administer.  Formatting of this note might be different from the original.   Last DEXA showed T score -2.9 (per patient, seen at [facility]).  Formatting of this note might be different from the original.   Formatting of this note might be different  from the original.    Last DEXA showed T score -2.9 (per patient, seen at [facility]).   Formatting of this note might be different from the original.    Initial bone density 2003 showed lumbar spine osteopenia with a T-score-1.6.  Hip bone density was normal.    Bilateral hip replacements and progressive DJD lead to radius bone density measured 2011 showing osteopenia with a T-score -2.2.  Bone density 2017 osteoporosis in the 1/3 radius T-score -2.9.       CT bone strength assessment 2018 T12 vertebral strength in the fragile range.       Fractures:      Left wrist years ago when she slipped on the ice and tripped over her dog.      Possible rib fracture 2017.    2020 fell running and sustained L wrist fx and concussion       Treatment:    Alendronate, took for a couple weeks developed severe musculoskeletal pain and discontinued.    Risedronate 35 mg weekly 2018     Prolia 2021 - present        Last Assessment & Plan:     Formatting of this note might be different from the original.    Endo 10/2020: She will consider possible switch to Prolia. Local primary care team to administer.      Insomnia 07/31/2018     Priority: Medium     Formatting of this note might be different from the original.     On Ambien 5 mg daily, prescribed by local PCP  Formatting of this note might be different from the original.   Formatting of this note might be different from the original.      On Ambien 5 mg daily, prescribed by local PCP      Mixed anxiety depressive disorder 07/31/2018     Priority: Medium     Formatting of this note might be different from the original.     Ongoing history of anxiety and depression, as well as h/o PTSD     Currently managed with lorazepam 0.5 mg as needed (approximately 4 times per month)     Previously on antidepressants but was d/c due to symptom improved     She completed therapy in the past  Formatting of this note might be different from the original.   Formatting of this note might be  different from the original.      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     Negative TPO antibodies  Formatting of this note might be different from the original.   Formatting of this note might be different from the original.      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     Worked up for diarrhea in early 2021     3/2021 EGD and colonoscopy normal, including random biopsies; celiac testing negative     Symptoms resolved with GF diet  Formatting of this note might be different from the original.   Formatting of this note might be different from the original.      Worked up for diarrhea in early 2021      3/2021 EGD and colonoscopy normal, including random biopsies; celiac testing negative      Symptoms resolved with GF diet      Obstructive sleep apnea 08/04/2015     Priority: Medium     Problem list name updated by automated process. Provider to review      Personal history of other malignant neoplasm of skin 12/02/2004     Priority: Medium     Formatting of this note might be different from the original.     BCC, left cheek, treated elsewhere with Mohs     SCC, left shin, treated elsewhere 2022     Followed by dermatology annually  Formatting of this note might be different from the original.   Formatting of this note might be different from the  original.      BCC, left cheek, treated elsewhere with Mohs      SCC, left shin, treated elsewhere 2022      Followed by dermatology annually      Restless leg syndrome 12/01/2004     Priority: Medium     Formatting of this note might be different from the original.     Symptoms improved with use of Ambien 5 mg daily (rx for insomnia)  Formatting of this note might be different from the original.   Formatting of this note might be different from the original.      Symptoms improved with use of Ambien 5 mg daily (rx for insomnia)      Hyperlipidemia 11/12/2004     Priority: Medium     Formatting of this note might be different from the original.     Treatment:  Rosuvastatin 5 mg daily     Lifelong nonsmoker      No personal history of cardiovascular event      Family history:  Father with HLD, CAD, and CVA age 60; mother with HLD and CAD  Formatting of this note might be different from the original.   Patient also has elevated Lipo A  Formatting of this note might be different from the original.   Formatting of this note might be different from the original.      Treatment:  Rosuvastatin 5 mg daily      Lifelong nonsmoker       No personal history of cardiovascular event       Family history:  Father with HLD, CAD, and CVA age 60; mother with HLD and CAD        Current Outpatient Medications   Medication Sig Dispense Refill    zolpidem (AMBIEN) 5 MG tablet take 1 tab PRN      denosumab (PROLIA) 60 MG/ML SOSY injection Inject 60 mg Subcutaneous every 6 months      escitalopram (LEXAPRO) 5 MG tablet Take 0.5 tablets (2.5 mg) by mouth daily for 4 days, THEN 1 tablet (5 mg) daily for 26 days. 30 tablet 0    mirtazapine (REMERON) 7.5 MG tablet Take 7.5 mg by mouth At Bedtime      Omega-3 Fatty Acids (OMEGA-3 FISH OIL PO) Take 1 g by mouth      polycarbophil (FIBERCON) 625 MG tablet Take 3 tablets by mouth daily      rosuvastatin (CRESTOR) 5 MG tablet TK 1 T PO HS      Urea 40 % CREA Externally apply topically 2 times  "daily      vitamin D3 (CHOLECALCIFEROL) 2000 units (50 mcg) tablet Take 1 tablet by mouth daily       No current facility-administered medications for this visit.     Ht 1.727 m (5' 8\")   Wt 60.3 kg (133 lb)   BMI 20.22 kg/m        This note was written with the assistance of the Dragon voice-dictation technology software. The final document, although reviewed, may contain errors. For corrections, please contact the office.    "

## 2024-07-09 NOTE — NURSING NOTE
Current patient location: [address] [place] [id number]    Is the patient currently in the state of [place]? YES    Visit mode:VIDEO    If the visit is dropped, the patient can be reconnected by: VIDEO VISIT: Send to e-mail at: [email or web address]    Will anyone else be joining the visit? NO  (If patient encounters technical issues they should call [phone]    How would you like to obtain your AVS? MyChart    Are changes needed to the allergy or medication list? No    Are refills needed on medications prescribed by this physician? NO    Reason for visit: JILLIAN CARMONA

## 2024-07-09 NOTE — NURSING NOTE
1 year appointment reminder message was created and will be sent out 2 - 3 months prior to next appointment due date. Via staff message

## 2025-02-12 ENCOUNTER — TELEPHONE (OUTPATIENT)
Dept: SLEEP MEDICINE | Facility: CLINIC | Age: 85
End: 2025-02-12
Payer: COMMERCIAL

## 2025-02-12 DIAGNOSIS — G47.00 INSOMNIA, UNSPECIFIED TYPE: ICD-10-CM

## 2025-02-12 DIAGNOSIS — G25.81 RESTLESS LEG SYNDROME: ICD-10-CM

## 2025-02-12 DIAGNOSIS — G47.33 OBSTRUCTIVE SLEEP APNEA: Primary | ICD-10-CM

## 2025-02-12 NOTE — TELEPHONE ENCOUNTER
Patient needs replacement device as hers is broken. Last OV 7/9/2024. DME [facility].    DME orders pended for provider to review and sign.

## 2025-03-22 ENCOUNTER — HEALTH MAINTENANCE LETTER (OUTPATIENT)
Age: 85
End: 2025-03-22

## 2025-05-05 ENCOUNTER — VIRTUAL VISIT (OUTPATIENT)
Dept: SLEEP MEDICINE | Facility: CLINIC | Age: 85
End: 2025-05-05
Payer: COMMERCIAL

## 2025-05-05 VITALS — BODY MASS INDEX: 20.46 KG/M2 | WEIGHT: 135 LBS | HEIGHT: 68 IN

## 2025-05-05 DIAGNOSIS — G47.33 OSA (OBSTRUCTIVE SLEEP APNEA): Primary | ICD-10-CM

## 2025-05-05 DIAGNOSIS — G47.00 INSOMNIA, UNSPECIFIED TYPE: ICD-10-CM

## 2025-05-05 DIAGNOSIS — G25.81 RESTLESS LEG SYNDROME: ICD-10-CM

## 2025-05-05 PROCEDURE — 1126F AMNT PAIN NOTED NONE PRSNT: CPT | Mod: 95 | Performed by: PHYSICIAN ASSISTANT

## 2025-05-05 PROCEDURE — 98006 SYNCH AUDIO-VIDEO EST MOD 30: CPT | Performed by: PHYSICIAN ASSISTANT

## 2025-05-05 ASSESSMENT — SLEEP AND FATIGUE QUESTIONNAIRES
HOW LIKELY ARE YOU TO NOD OFF OR FALL ASLEEP IN A CAR, WHILE STOPPED FOR A FEW MINUTES IN TRAFFIC: WOULD NEVER DOZE
HOW LIKELY ARE YOU TO NOD OFF OR FALL ASLEEP WHILE SITTING INACTIVE IN A PUBLIC PLACE: WOULD NEVER DOZE
HOW LIKELY ARE YOU TO NOD OFF OR FALL ASLEEP WHILE SITTING AND READING: WOULD NEVER DOZE
HOW LIKELY ARE YOU TO NOD OFF OR FALL ASLEEP WHILE SITTING QUIETLY AFTER LUNCH WITHOUT ALCOHOL: WOULD NEVER DOZE
HOW LIKELY ARE YOU TO NOD OFF OR FALL ASLEEP WHILE LYING DOWN TO REST IN THE AFTERNOON WHEN CIRCUMSTANCES PERMIT: MODERATE CHANCE OF DOZING
HOW LIKELY ARE YOU TO NOD OFF OR FALL ASLEEP WHEN YOU ARE A PASSENGER IN A CAR FOR AN HOUR WITHOUT A BREAK: WOULD NEVER DOZE
HOW LIKELY ARE YOU TO NOD OFF OR FALL ASLEEP WHILE SITTING AND TALKING TO SOMEONE: WOULD NEVER DOZE
HOW LIKELY ARE YOU TO NOD OFF OR FALL ASLEEP WHILE WATCHING TV: MODERATE CHANCE OF DOZING

## 2025-05-05 ASSESSMENT — PAIN SCALES - GENERAL: PAINLEVEL_OUTOF10: NO PAIN (0)

## 2025-05-05 NOTE — PROGRESS NOTES
Virtual Visit Details    Type of service:  Video Visit   Video Start Time: 3:05 PM  Video End Time:3:36 PM    Originating Location (pt. Location): Home    Distant Location (provider location):  Off-site  Platform used for Video Visit: [facility]    CPAP Follow-Up Visit:    Date on this visit: 5/5/2025    Jonna Bloom has a follow-up of their moderate sleep apnea, managed with CPAP.        Previous Study Results:   3/21/2023 [facility] Study (126.0 lbs) - AHI 19.7, RDI 20.3, Supine AHI - REM AHI - Low O2 88.0%, Time Spent <=88% 0.7 minutes / Time Spent <=89% 3.1 minutes. The final pressure was CPAP 5 cmH2O with an AHI of 0 events per hour. Time in REM supine on final pressure was - minutes.      PSG  ANGELA: 2004 study at [place] with AHI of 17.6/hr. Her AHI was 57.7/hr on her back and 0.6/hr off her back.  She had tried positional restriction and a dental appliance but developed jaw pain.      ResMed (replacement device obtained 2/28/25)  Auto-PAP 5 - 15 cmH2O 30 day usage data:    The compliance data shows that the patient used the CPAP for 26/30 nights, 83% of nights for >4 hours.  The 95th% pressure is 6.9 cm.  The 95th% leak is 19.5 lpm.  The average nightly usage is 8:53, median 9:14.  The average AHI is 0.2/hr.       Today's visit is primarily to document compliance to a replacement CPAP. She is not crazy about her mask (AirFit N20). She used to use the Dreamwear nasal mask, which seemed to work for her. She was told the N30i would not work for her.        No specialty comments available.    Do you use a CPAP Machine at home: (Patient-Rptd) Yes  Overall, on a scale of 0-10 how would you rate your CPAP (0 poor, 10 great): (Patient-Rptd) 8    What type of mask do you use: (Patient-Rptd) Nasal Mask AirFit N20, AirFit F30i, Dreamwear nasal   Is your mask comfortable: (Patient-Rptd) No  If not, why: (Patient-Rptd) it fits over nose and rubs  How often do you replace supplies:    Is your mask leaking:  (Patient-Rptd) No  If yes, where do you feel it:    How many night per week does the mask leak (0-7):      Do you notice snoring with mask on: (Patient-Rptd) No  Do you notice gasping arousals with mask on: (Patient-Rptd) No  Are you having significant oral or nasal dryness: (Patient-Rptd) No  Are you using the humidifier: yes  Does the water chamber run out before the night is over:no, but only lasts 1 night.  Do you get condensation in the mask or hose:no  Is the pressure setting comfortable: (Patient-Rptd) Yes  If not, why:      Typical bedtime: (Patient-Rptd) 8:00 pm, watches TV until about 10 PM  Sleep latency on PAP therapy: (Patient-Rptd) usually right away. She uses zolpidem 5 mg and it helps to treat restless legs. Without the zolpidem, her legs would twitch for 2-3 hours. She also takes mirtazapine 7.5 mg for depression and appetite for the last few months. Her daughter and daughter-in-law are ill. She does not take antihistamines.  Typical wake time: (Patient-Rptd) 7:00 a. 6 AM.   Wakes 1 times per night for 5-10 minutes. Reason for waking: restroom and let dog out  How many hours on average per night are you using PAP therapy: (Patient-Rptd) 9-10  How many hours are you sleeping per night: (Patient-Rptd) 9-10  Do you feel well rested in the morning: (Patient-Rptd) Yes    Naps: generally no. Sometimes in the spring with gardening.          Weight change since sleep study: 135 lbs      Past medical/surgical history, family history, social history, medications and allergies were reviewed.      Problem List:  Patient Active Problem List    Diagnosis Date Noted    Aneurysm of ascending aorta without rupture 04/10/2024     Priority: Medium    Anxiety state 01/13/2024     Priority: Medium    GERD (gastroesophageal reflux disease) 01/13/2024     Priority: Medium    Paroxysmal supraventricular tachycardia 01/13/2024     Priority: Medium    History of bilateral hip replacements 01/10/2024     Priority: Medium     Irritable bowel syndrome with diarrhea 11/04/2022     Priority: Medium     Formatting of this note might be different from the original.     Longstanding h/o IBS, diarrhea predominant     Symptoms worse with increased anxiety and stress  Formatting of this note might be different from the original.   Formatting of this note might be different from the original.      Longstanding h/o IBS, diarrhea predominant      Symptoms worse with increased anxiety and stress      Osteoporosis 08/08/2018     Priority: Medium     Formatting of this note might be different from the original.   Initial bone density 2003 showed lumbar spine osteopenia with a T-score-1.6.  Hip bone density was normal.   Bilateral hip replacements and progressive DJD lead to radius bone density measured 2011 showing osteopenia with a T-score -2.2.  Bone density 2017 osteoporosis in the 1/3 radius T-score -2.9.      CT bone strength assessment 2018 T12 vertebral strength in the fragile range.      Fractures:     Left wrist years ago when she slipped on the ice and tripped over her dog.     Possible rib fracture 2017.   2020 fell running and sustained L wrist fx and concussion      Treatment:   Alendronate, took for a couple weeks developed severe musculoskeletal pain and discontinued.   Risedronate 35 mg weekly 2018    Prolia 2021 - present       Last Assessment & Plan:    Formatting of this note might be different from the original.   Endo 10/2020: She will consider possible switch to Prolia. Local primary care team to administer.  Formatting of this note might be different from the original.   Last DEXA showed T score -2.9 (per patient, seen at [facility]).  Formatting of this note might be different from the original.   Formatting of this note might be different from the original.    Last DEXA showed T score -2.9 (per patient, seen at [facility]).   Formatting of this note might be different from the original.    Initial bone density 2003 showed  lumbar spine osteopenia with a T-score-1.6.  Hip bone density was normal.    Bilateral hip replacements and progressive DJD lead to radius bone density measured 2011 showing osteopenia with a T-score -2.2.  Bone density 2017 osteoporosis in the 1/3 radius T-score -2.9.       CT bone strength assessment 2018 T12 vertebral strength in the fragile range.       Fractures:      Left wrist years ago when she slipped on the ice and tripped over her dog.      Possible rib fracture 2017.    2020 fell running and sustained L wrist fx and concussion       Treatment:    Alendronate, took for a couple weeks developed severe musculoskeletal pain and discontinued.    Risedronate 35 mg weekly 2018     Prolia 2021 - present        Last Assessment & Plan:     Formatting of this note might be different from the original.    Endo 10/2020: She will consider possible switch to Prolia. Local primary care team to administer.      Insomnia 07/31/2018     Priority: Medium     Formatting of this note might be different from the original.     On Ambien 5 mg daily, prescribed by local PCP  Formatting of this note might be different from the original.   Formatting of this note might be different from the original.      On Ambien 5 mg daily, prescribed by local PCP      Mixed anxiety depressive disorder 07/31/2018     Priority: Medium     Formatting of this note might be different from the original.     Ongoing history of anxiety and depression, as well as h/o PTSD     Currently managed with lorazepam 0.5 mg as needed (approximately 4 times per month)     Previously on antidepressants but was d/c due to symptom improved     She completed therapy in the past  Formatting of this note might be different from the original.   Formatting of this note might be different from the original.      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     Negative TPO antibodies  Formatting of this note might be different from the original.   Formatting of this note might be different from the original.      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     Worked up for diarrhea in early 2021     3/2021 EGD and colonoscopy normal, including random biopsies; celiac testing negative     Symptoms resolved with GF diet  Formatting of this note might be different from the original.   Formatting of this note might be different from the original.      Worked up for diarrhea in early 2021      3/2021 EGD and colonoscopy normal, including random biopsies; celiac testing negative      Symptoms resolved with GF diet      Obstructive sleep apnea 08/04/2015     Priority: Medium     Problem list name updated by automated process. Provider to review      Personal history of other malignant neoplasm of skin 12/02/2004     Priority: Medium     Formatting of this note might be different from the original.     BCC, left cheek, treated elsewhere with Mohs     SCC, left shin, treated elsewhere 2022     Followed by dermatology annually  Formatting of this note might be different from the original.   Formatting of this note might be different from the original.      BCC, left cheek, treated elsewhere with Mohs      SCC, left shin, treated elsewhere 2022      Followed by dermatology annually      Restless leg syndrome 12/01/2004      Priority: Medium     Formatting of this note might be different from the original.     Symptoms improved with use of Ambien 5 mg daily (rx for insomnia)  Formatting of this note might be different from the original.   Formatting of this note might be different from the original.      Symptoms improved with use of Ambien 5 mg daily (rx for insomnia)      Hyperlipidemia 11/12/2004     Priority: Medium     Formatting of this note might be different from the original.     Treatment:  Rosuvastatin 5 mg daily     Lifelong nonsmoker      No personal history of cardiovascular event      Family history:  Father with HLD, CAD, and CVA age 60; mother with HLD and CAD  Formatting of this note might be different from the original.   Patient also has elevated Lipo A  Formatting of this note might be different from the original.   Formatting of this note might be different from the original.      Treatment:  Rosuvastatin 5 mg daily      Lifelong nonsmoker       No personal history of cardiovascular event       Family history:  Father with HLD, CAD, and CVA age 60; mother with HLD and CAD          Impression/Plan:    (G47.33) ANGELA (obstructive sleep apnea)  (primary encounter diagnosis)  Comment: Jonna is using CPAP nightly and benefits from use. She has a lot of questions about masks and compatibility with her home CPAP and travel CPAP. She prefers the Dreamwear nasal or AirFit N30i, but was told they wouldn't work for her, even though she seemed to do well with the Dreamwear nasal mask in the past.   Plan: Continue auto CPAP 5-15 cm. A prescription was written for new supplies.    Schedule appt with [facility] to review use of her supplies.    (G47.00) Insomnia, unspecified type  Comment: She has been taking 5 mg zolpidem for insomnia and RLS for years with good response and no side effects. She has tried gabapentin for RLS without sufficient benefit and CBT for insomnia.  She is in bed 8 PM to 7 AM, but estimates she only sleeps 10 PM  to 6 AM. She watches TV before sleep.  She has also recently been started on mirtazapine 7.5 mg for depression and weight gain.  Plan: Continue zolpidem 5 mg. We talked about limiting time in bed further. She did not seem interested in changing things at this time.    (G25.81) Restless leg syndrome  Comment: Well controlled with 5 mg zolpidem. Has tried gabapentin and did not respond. She has been seen at [facility] [facility] for second opinions and they also felt it was reasonable to continue with this plan. Ferritin was 89 in 7/2018.  Plan: Continue zolpidem 5 mg at night.      She will follow up with me in about 1 year(s).     38 minutes were spent on the date of the encounter doing chart review, history and exam, documentation and further activities as noted above.     Bennett Goltz, PA-C    CC: No ref. provider found

## 2025-05-05 NOTE — NURSING NOTE
Current patient location: [address] [place] [id number]    Is the patient currently in the state of [place]? YES    Visit mode: VIDEO    If the visit is dropped, the patient can be reconnected by:VIDEO VISIT: Send to e-mail at: [email or web address]    Will anyone else be joining the visit? NO  (If patient encounters technical issues they should call [phone]    Are changes needed to the allergy or medication list? No    Are refills needed on medications prescribed by this physician? NO    Rooming Documentation:  Questionnaire(s) completed    Reason for visit: No chief complaint on file.    Juli BUSBYF

## 2025-05-21 ENCOUNTER — LAB REQUISITION (OUTPATIENT)
Dept: LAB | Facility: CLINIC | Age: 85
End: 2025-05-21
Payer: COMMERCIAL

## 2025-05-21 DIAGNOSIS — D48.5 NEOPLASM OF UNCERTAIN BEHAVIOR OF SKIN: ICD-10-CM

## 2025-05-21 PROCEDURE — 88305 TISSUE EXAM BY PATHOLOGIST: CPT | Mod: 26 | Performed by: DERMATOLOGY

## 2025-05-21 PROCEDURE — 88305 TISSUE EXAM BY PATHOLOGIST: CPT | Mod: TC,ORL | Performed by: DERMATOLOGY

## 2025-05-23 LAB
PATH REPORT.COMMENTS IMP SPEC: NORMAL
PATH REPORT.FINAL DX SPEC: NORMAL
PATH REPORT.GROSS SPEC: NORMAL
PATH REPORT.MICROSCOPIC SPEC OTHER STN: NORMAL
PATH REPORT.RELEVANT HX SPEC: NORMAL